# Patient Record
Sex: FEMALE | Race: WHITE | Employment: UNEMPLOYED | URBAN - METROPOLITAN AREA
[De-identification: names, ages, dates, MRNs, and addresses within clinical notes are randomized per-mention and may not be internally consistent; named-entity substitution may affect disease eponyms.]

---

## 2017-03-21 ENCOUNTER — HOSPITAL ENCOUNTER (OUTPATIENT)
Age: 22
Discharge: HOME | End: 2017-03-21
Payer: COMMERCIAL

## 2017-03-21 LAB
ABSOLUTE EOS #: 0.28 K/UL (ref 0–0.4)
ABSOLUTE LYMPH #: 3.34 K/UL (ref 1–4.8)
ABSOLUTE MONO #: 0.7 K/UL (ref 0.1–1.3)
ALBUMIN SERPL-MCNC: 4.1 G/DL (ref 3.5–5.2)
ALBUMIN/GLOBULIN RATIO: ABNORMAL (ref 1–2.5)
ALP BLD-CCNC: 86 U/L (ref 35–104)
ALT SERPL-CCNC: 22 U/L (ref 5–33)
ANION GAP SERPL CALCULATED.3IONS-SCNC: 14 MMOL/L (ref 9–17)
AST SERPL-CCNC: 24 U/L
BASOPHILS # BLD: 1 % (ref 0–2)
BASOPHILS ABSOLUTE: 0.14 K/UL (ref 0–0.2)
BILIRUB SERPL-MCNC: <0.15 MG/DL (ref 0.3–1.2)
BUN BLDV-MCNC: 13 MG/DL (ref 6–20)
BUN/CREAT BLD: ABNORMAL (ref 9–20)
CALCIUM SERPL-MCNC: 9.3 MG/DL (ref 8.6–10.4)
CHLORIDE BLD-SCNC: 102 MMOL/L (ref 98–107)
CO2: 25 MMOL/L (ref 20–31)
CREAT SERPL-MCNC: 0.56 MG/DL (ref 0.5–0.9)
DIFFERENTIAL TYPE: ABNORMAL
EOSINOPHILS RELATIVE PERCENT: 2 % (ref 0–4)
ESTIMATED AVERAGE GLUCOSE: 103 MG/DL
GFR AFRICAN AMERICAN: >60 ML/MIN
GFR NON-AFRICAN AMERICAN: >60 ML/MIN
GFR SERPL CREATININE-BSD FRML MDRD: ABNORMAL ML/MIN/{1.73_M2}
GFR SERPL CREATININE-BSD FRML MDRD: ABNORMAL ML/MIN/{1.73_M2}
GLUCOSE BLD-MCNC: 141 MG/DL (ref 70–99)
HBA1C MFR BLD: 5.2 % (ref 4–6)
HCT VFR BLD CALC: 40 % (ref 36–46)
HEMOGLOBIN: 13.2 G/DL (ref 12–16)
LYMPHOCYTES # BLD: 24 % (ref 24–44)
MCH RBC QN AUTO: 25.9 PG (ref 26–34)
MCHC RBC AUTO-ENTMCNC: 32.9 G/DL (ref 31–37)
MCV RBC AUTO: 78.6 FL (ref 80–100)
MONOCYTES # BLD: 5 % (ref 1–7)
MORPHOLOGY: ABNORMAL
PDW BLD-RTO: 13.9 % (ref 11.5–14.9)
PLATELET # BLD: 322 K/UL (ref 150–450)
PLATELET ESTIMATE: ABNORMAL
PMV BLD AUTO: 8.3 FL (ref 6–12)
POTASSIUM SERPL-SCNC: 4.1 MMOL/L (ref 3.7–5.3)
RBC # BLD: 5.09 M/UL (ref 4–5.2)
RBC # BLD: ABNORMAL 10*6/UL
SEG NEUTROPHILS: 68 % (ref 36–66)
SEGMENTED NEUTROPHILS ABSOLUTE COUNT: 9.44 K/UL (ref 1.3–9.1)
SODIUM BLD-SCNC: 141 MMOL/L (ref 135–144)
THYROXINE, FREE: 1.08 NG/DL (ref 0.93–1.7)
TOTAL PROTEIN: 7 G/DL (ref 6.4–8.3)
TSH SERPL DL<=0.05 MIU/L-ACNC: 2.38 MIU/L (ref 0.3–5)
WBC # BLD: 13.9 K/UL (ref 3.5–11)
WBC # BLD: ABNORMAL 10*3/UL

## 2017-03-21 PROCEDURE — 82652 VIT D 1 25-DIHYDROXY: CPT

## 2017-03-21 PROCEDURE — 84443 ASSAY THYROID STIM HORMONE: CPT

## 2017-03-21 PROCEDURE — 85025 COMPLETE CBC W/AUTO DIFF WBC: CPT

## 2017-03-21 PROCEDURE — 84439 ASSAY OF FREE THYROXINE: CPT

## 2017-03-21 PROCEDURE — 36415 COLL VENOUS BLD VENIPUNCTURE: CPT

## 2017-03-21 PROCEDURE — 83036 HEMOGLOBIN GLYCOSYLATED A1C: CPT

## 2017-03-21 PROCEDURE — 80053 COMPREHEN METABOLIC PANEL: CPT

## 2017-03-23 LAB — VITAMIN D 1,25-DIHYDROXY: 35.9 PG/ML (ref 19.9–79.3)

## 2017-03-29 ENCOUNTER — HOSPITAL ENCOUNTER (OUTPATIENT)
Age: 22
Discharge: HOME | End: 2017-03-29
Payer: COMMERCIAL

## 2017-03-29 LAB
ABSOLUTE EOS #: 0.38 K/UL (ref 0–0.4)
ABSOLUTE LYMPH #: 3.75 K/UL (ref 1–4.8)
ABSOLUTE MONO #: 0.75 K/UL (ref 0.1–1.3)
ALBUMIN SERPL-MCNC: 4.1 G/DL (ref 3.5–5.2)
ALBUMIN/GLOBULIN RATIO: ABNORMAL (ref 1–2.5)
ALP BLD-CCNC: 82 U/L (ref 35–104)
ALT SERPL-CCNC: 25 U/L (ref 5–33)
ANION GAP SERPL CALCULATED.3IONS-SCNC: 14 MMOL/L (ref 9–17)
AST SERPL-CCNC: 23 U/L
BASOPHILS # BLD: 1 % (ref 0–2)
BASOPHILS ABSOLUTE: 0.13 K/UL (ref 0–0.2)
BILIRUB SERPL-MCNC: 0.2 MG/DL (ref 0.3–1.2)
BUN BLDV-MCNC: 11 MG/DL (ref 6–20)
BUN/CREAT BLD: ABNORMAL (ref 9–20)
CALCIUM SERPL-MCNC: 9.5 MG/DL (ref 8.6–10.4)
CHLORIDE BLD-SCNC: 105 MMOL/L (ref 98–107)
CHOLESTEROL/HDL RATIO: 4.6
CHOLESTEROL: 190 MG/DL
CO2: 23 MMOL/L (ref 20–31)
CREAT SERPL-MCNC: 0.53 MG/DL (ref 0.5–0.9)
DIFFERENTIAL TYPE: ABNORMAL
EOSINOPHILS RELATIVE PERCENT: 3 % (ref 0–4)
GFR AFRICAN AMERICAN: >60 ML/MIN
GFR NON-AFRICAN AMERICAN: >60 ML/MIN
GFR SERPL CREATININE-BSD FRML MDRD: ABNORMAL ML/MIN/{1.73_M2}
GFR SERPL CREATININE-BSD FRML MDRD: ABNORMAL ML/MIN/{1.73_M2}
GLUCOSE BLD-MCNC: 95 MG/DL (ref 70–99)
HCT VFR BLD CALC: 40.6 % (ref 36–46)
HDLC SERPL-MCNC: 41 MG/DL
HEMOGLOBIN: 13.2 G/DL (ref 12–16)
LDL CHOLESTEROL: 127 MG/DL (ref 0–130)
LYMPHOCYTES # BLD: 30 % (ref 24–44)
MCH RBC QN AUTO: 25.6 PG (ref 26–34)
MCHC RBC AUTO-ENTMCNC: 32.5 G/DL (ref 31–37)
MCV RBC AUTO: 79 FL (ref 80–100)
MONOCYTES # BLD: 6 % (ref 1–7)
MORPHOLOGY: ABNORMAL
PDW BLD-RTO: 14.4 % (ref 11.5–14.9)
PLATELET # BLD: 287 K/UL (ref 150–450)
PLATELET ESTIMATE: ABNORMAL
PMV BLD AUTO: 9 FL (ref 6–12)
POTASSIUM SERPL-SCNC: 4.7 MMOL/L (ref 3.7–5.3)
RBC # BLD: 5.14 M/UL (ref 4–5.2)
RBC # BLD: ABNORMAL 10*6/UL
SEG NEUTROPHILS: 60 % (ref 36–66)
SEGMENTED NEUTROPHILS ABSOLUTE COUNT: 7.49 K/UL (ref 1.3–9.1)
SODIUM BLD-SCNC: 142 MMOL/L (ref 135–144)
THYROXINE, FREE: 1.05 NG/DL (ref 0.93–1.7)
TOTAL PROTEIN: 7.4 G/DL (ref 6.4–8.3)
TRIGL SERPL-MCNC: 108 MG/DL
TSH SERPL DL<=0.05 MIU/L-ACNC: 2.26 MIU/L (ref 0.3–5)
VLDLC SERPL CALC-MCNC: NORMAL MG/DL (ref 1–30)
WBC # BLD: 12.5 K/UL (ref 3.5–11)
WBC # BLD: ABNORMAL 10*3/UL

## 2017-03-29 PROCEDURE — 80053 COMPREHEN METABOLIC PANEL: CPT

## 2017-03-29 PROCEDURE — 84443 ASSAY THYROID STIM HORMONE: CPT

## 2017-03-29 PROCEDURE — 85025 COMPLETE CBC W/AUTO DIFF WBC: CPT

## 2017-03-29 PROCEDURE — 84439 ASSAY OF FREE THYROXINE: CPT

## 2017-03-29 PROCEDURE — 83036 HEMOGLOBIN GLYCOSYLATED A1C: CPT

## 2017-03-29 PROCEDURE — 82652 VIT D 1 25-DIHYDROXY: CPT

## 2017-03-29 PROCEDURE — 36415 COLL VENOUS BLD VENIPUNCTURE: CPT

## 2017-03-29 PROCEDURE — 80061 LIPID PANEL: CPT

## 2017-03-30 LAB
ESTIMATED AVERAGE GLUCOSE: 105 MG/DL
HBA1C MFR BLD: 5.3 % (ref 4–6)

## 2017-03-31 LAB — VITAMIN D 1,25-DIHYDROXY: 63.2 PG/ML (ref 19.9–79.3)

## 2017-05-31 ENCOUNTER — OFFICE VISIT (OUTPATIENT)
Dept: FAMILY MEDICINE CLINIC | Age: 22
End: 2017-05-31
Payer: COMMERCIAL

## 2017-05-31 VITALS
HEIGHT: 59 IN | BODY MASS INDEX: 34.68 KG/M2 | SYSTOLIC BLOOD PRESSURE: 118 MMHG | DIASTOLIC BLOOD PRESSURE: 62 MMHG | OXYGEN SATURATION: 97 % | HEART RATE: 80 BPM | TEMPERATURE: 98 F | WEIGHT: 172 LBS | RESPIRATION RATE: 14 BRPM

## 2017-05-31 DIAGNOSIS — K21.9 GASTROESOPHAGEAL REFLUX DISEASE WITHOUT ESOPHAGITIS: ICD-10-CM

## 2017-05-31 DIAGNOSIS — F41.9 ANXIETY: ICD-10-CM

## 2017-05-31 DIAGNOSIS — J45.20 MILD INTERMITTENT ASTHMA WITHOUT COMPLICATION: Primary | ICD-10-CM

## 2017-05-31 DIAGNOSIS — F32.4 MAJOR DEPRESSIVE DISORDER WITH SINGLE EPISODE, IN PARTIAL REMISSION (HCC): ICD-10-CM

## 2017-05-31 PROCEDURE — 99203 OFFICE O/P NEW LOW 30 MIN: CPT | Performed by: FAMILY MEDICINE

## 2017-05-31 RX ORDER — FLUTICASONE PROPIONATE 110 UG/1
1 AEROSOL, METERED RESPIRATORY (INHALATION) 2 TIMES DAILY
Qty: 1 INHALER | Refills: 3 | Status: SHIPPED | OUTPATIENT
Start: 2017-05-31 | End: 2017-12-02 | Stop reason: SDUPTHER

## 2017-05-31 RX ORDER — ALBUTEROL SULFATE 90 UG/1
2 AEROSOL, METERED RESPIRATORY (INHALATION) EVERY 6 HOURS PRN
Qty: 1 INHALER | Refills: 3 | Status: SHIPPED | OUTPATIENT
Start: 2017-05-31 | End: 2019-01-28 | Stop reason: SDUPTHER

## 2017-05-31 RX ORDER — OMEPRAZOLE 20 MG/1
CAPSULE, DELAYED RELEASE ORAL
Qty: 30 CAPSULE | Refills: 3 | Status: SHIPPED | OUTPATIENT
Start: 2017-05-31 | End: 2017-09-12 | Stop reason: SDUPTHER

## 2017-09-12 ENCOUNTER — OFFICE VISIT (OUTPATIENT)
Dept: FAMILY MEDICINE CLINIC | Age: 22
End: 2017-09-12
Payer: COMMERCIAL

## 2017-09-12 ENCOUNTER — HOSPITAL ENCOUNTER (OUTPATIENT)
Age: 22
Setting detail: SPECIMEN
Discharge: HOME | End: 2017-09-12
Payer: COMMERCIAL

## 2017-09-12 VITALS
TEMPERATURE: 98.4 F | BODY MASS INDEX: 35.71 KG/M2 | WEIGHT: 176.8 LBS | DIASTOLIC BLOOD PRESSURE: 61 MMHG | SYSTOLIC BLOOD PRESSURE: 100 MMHG | HEART RATE: 87 BPM

## 2017-09-12 DIAGNOSIS — Z12.4 PAP SMEAR FOR CERVICAL CANCER SCREENING: ICD-10-CM

## 2017-09-12 DIAGNOSIS — K21.9 GASTROESOPHAGEAL REFLUX DISEASE WITHOUT ESOPHAGITIS: ICD-10-CM

## 2017-09-12 DIAGNOSIS — Z12.4 PAP SMEAR FOR CERVICAL CANCER SCREENING: Primary | ICD-10-CM

## 2017-09-12 PROCEDURE — 99395 PREV VISIT EST AGE 18-39: CPT | Performed by: FAMILY MEDICINE

## 2017-09-12 RX ORDER — OMEPRAZOLE 40 MG/1
CAPSULE, DELAYED RELEASE ORAL
Qty: 30 CAPSULE | Refills: 3 | Status: SHIPPED | OUTPATIENT
Start: 2017-09-12 | End: 2017-12-27 | Stop reason: SDUPTHER

## 2017-09-12 ASSESSMENT — PATIENT HEALTH QUESTIONNAIRE - PHQ9
2. FEELING DOWN, DEPRESSED OR HOPELESS: 0
SUM OF ALL RESPONSES TO PHQ9 QUESTIONS 1 & 2: 0
SUM OF ALL RESPONSES TO PHQ QUESTIONS 1-9: 0
1. LITTLE INTEREST OR PLEASURE IN DOING THINGS: 0

## 2017-09-13 LAB
C TRACH DNA GENITAL QL NAA+PROBE: NEGATIVE
N. GONORRHOEAE DNA: NEGATIVE

## 2017-09-14 LAB
HPV SAMPLE: NORMAL
HPV SOURCE: NORMAL
HPV, GENOTYPE 16: NOT DETECTED
HPV, GENOTYPE 18: NOT DETECTED
HPV, HIGH RISK OTHER: NOT DETECTED
HPV, INTERPRETATION: NORMAL

## 2017-09-20 LAB — CYTOLOGY REPORT: NORMAL

## 2017-11-13 ENCOUNTER — OFFICE VISIT (OUTPATIENT)
Dept: FAMILY MEDICINE CLINIC | Age: 22
End: 2017-11-13
Payer: COMMERCIAL

## 2017-11-13 VITALS
BODY MASS INDEX: 35.56 KG/M2 | SYSTOLIC BLOOD PRESSURE: 110 MMHG | WEIGHT: 176.4 LBS | TEMPERATURE: 98.4 F | OXYGEN SATURATION: 98 % | DIASTOLIC BLOOD PRESSURE: 60 MMHG | HEART RATE: 99 BPM | HEIGHT: 59 IN | RESPIRATION RATE: 20 BRPM

## 2017-11-13 DIAGNOSIS — A60.04 HERPES SIMPLEX VULVOVAGINITIS: ICD-10-CM

## 2017-11-13 DIAGNOSIS — J45.20 MILD INTERMITTENT ASTHMA WITHOUT COMPLICATION: ICD-10-CM

## 2017-11-13 DIAGNOSIS — K21.9 GASTROESOPHAGEAL REFLUX DISEASE WITHOUT ESOPHAGITIS: Primary | ICD-10-CM

## 2017-11-13 DIAGNOSIS — Z80.3 FAMILY HISTORY OF BREAST CANCER: ICD-10-CM

## 2017-11-13 DIAGNOSIS — Z23 NEED FOR VACCINATION: ICD-10-CM

## 2017-11-13 PROCEDURE — G8417 CALC BMI ABV UP PARAM F/U: HCPCS | Performed by: FAMILY MEDICINE

## 2017-11-13 PROCEDURE — G8427 DOCREV CUR MEDS BY ELIG CLIN: HCPCS | Performed by: FAMILY MEDICINE

## 2017-11-13 PROCEDURE — G8484 FLU IMMUNIZE NO ADMIN: HCPCS | Performed by: FAMILY MEDICINE

## 2017-11-13 PROCEDURE — 90471 IMMUNIZATION ADMIN: CPT | Performed by: FAMILY MEDICINE

## 2017-11-13 PROCEDURE — 90688 IIV4 VACCINE SPLT 0.5 ML IM: CPT | Performed by: FAMILY MEDICINE

## 2017-11-13 PROCEDURE — 1036F TOBACCO NON-USER: CPT | Performed by: FAMILY MEDICINE

## 2017-11-13 PROCEDURE — 99214 OFFICE O/P EST MOD 30 MIN: CPT | Performed by: FAMILY MEDICINE

## 2017-11-13 RX ORDER — VALACYCLOVIR HYDROCHLORIDE 500 MG/1
500 TABLET, FILM COATED ORAL 2 TIMES DAILY
Qty: 14 TABLET | Refills: 0 | Status: SHIPPED | OUTPATIENT
Start: 2017-11-13 | End: 2017-11-20

## 2017-11-13 ASSESSMENT — ENCOUNTER SYMPTOMS
VOMITING: 0
RHINORRHEA: 0
FACIAL SWELLING: 0
SINUS PRESSURE: 0
DIARRHEA: 0
COUGH: 0
BACK PAIN: 0
PHOTOPHOBIA: 0
BLOOD IN STOOL: 0
CONSTIPATION: 0
WHEEZING: 0
SHORTNESS OF BREATH: 0
ABDOMINAL PAIN: 0

## 2017-11-13 NOTE — PROGRESS NOTES
is not valid for  the evaluation of suspected sexual abuse or for other forensic  purposes.                       Most recent labs reviewed:     Lab Results   Component Value Date    WBC 12.5 (H) 03/29/2017    HGB 13.2 03/29/2017    HCT 40.6 03/29/2017    MCV 79.0 (L) 03/29/2017     03/29/2017       Lab Results   Component Value Date     03/29/2017    K 4.7 03/29/2017     03/29/2017    CO2 23 03/29/2017    BUN 11 03/29/2017    CREATININE 0.53 03/29/2017    GLUCOSE 95 03/29/2017    GLUCOSE 85 12/10/2011    CALCIUM 9.5 03/29/2017        Lab Results   Component Value Date    ALT 25 03/29/2017    AST 23 03/29/2017    ALKPHOS 82 03/29/2017    BILITOT 0.20 (L) 03/29/2017       Lab Results   Component Value Date    TSH 2.26 03/29/2017       Lab Results   Component Value Date    CHOL 190 03/29/2017    CHOL 186 02/04/2015    CHOL 170 05/10/2013     Lab Results   Component Value Date    TRIG 108 03/29/2017    TRIG 140 02/04/2015    TRIG 179 (H) 05/10/2013     Lab Results   Component Value Date    HDL 41 03/29/2017    HDL 40 (L) 02/04/2015    HDL 42 05/10/2013     Lab Results   Component Value Date    LDLCHOLESTEROL 127 03/29/2017    LDLCHOLESTEROL 118 02/04/2015    LDLCHOLESTEROL 92 05/10/2013     Lab Results   Component Value Date    VLDL NOT REPORTED 03/29/2017    VLDL NOT REPORTED 02/04/2015    VLDL NOT REPORTED 05/10/2013     Lab Results   Component Value Date    CHOLHDLRATIO 4.6 03/29/2017    CHOLHDLRATIO 4.7 02/04/2015    CHOLHDLRATIO 4.0 05/10/2013       Lab Results   Component Value Date    LABA1C 5.3 03/29/2017       No results found for: [id number]    No results found for: FOLATE    No results found for: IRON, TIBC, FERRITIN    No results found for: VITD25          Current Outpatient Prescriptions   Medication Sig Dispense Refill    valACYclovir (VALTREX) 500 MG tablet Take 1 tablet by mouth 2 times daily for 7 days 14 tablet 0    omeprazole (PRILOSEC) 40 MG delayed release capsule TAKE 1 CAPSULE BY MOUTH DAILY 30 capsule 3    fluticasone (FLOVENT HFA) 110 MCG/ACT inhaler Inhale 1 puff into the lungs 2 times daily 1 Inhaler 3    albuterol sulfate HFA (VENTOLIN HFA) 108 (90 BASE) MCG/ACT inhaler Inhale 2 puffs into the lungs every 6 hours as needed for Wheezing 1 Inhaler 3    sertraline (ZOLOFT) 25 MG tablet TK 1 T PO  Q NIGHT  1     No current facility-administered medications for this visit. Social History     Social History    Marital status: Single     Spouse name: N/A    Number of children: N/A    Years of education: N/A     Occupational History    Not on file. Social History Main Topics    Smoking status: Former Smoker     Quit date: 9/12/2012    Smokeless tobacco: Never Used    Alcohol use No    Drug use: No    Sexual activity: Yes     Partners: Male     Other Topics Concern    Not on file     Social History Narrative    No narrative on file     Counseling given: Not Answered        Family History   Problem Relation Age of Onset    Depression Mother     Cancer Paternal Aunt     Cancer Paternal Grandmother      breast    Diabetes Paternal Grandmother     Cancer Paternal Grandfather      lung             -rest of complaints with corresponding details per ROS    The patient's past medical, surgical, social, and family history as well as her current medications and allergies were reviewed as documented in today's encounter. Review of Systems   Constitutional: Negative for appetite change, chills, fatigue and unexpected weight change. HENT: Negative for congestion, facial swelling, postnasal drip, rhinorrhea and sinus pressure. Eyes: Negative for photophobia. Respiratory: Negative for cough, shortness of breath and wheezing. Cardiovascular: Negative for chest pain and palpitations. Gastrointestinal: Negative for abdominal pain, blood in stool, constipation, diarrhea and vomiting. Endocrine: Negative for polyphagia and polyuria.    Genitourinary: medications and allergies were reviewed as documented in today's encounter. Medications, labs, diagnostic studies, consultations and follow-up as documented in this encounter. Return in about 6 months (around 5/13/2018), or if symptoms worsen or fail to improve, for for penumovacc, hepres genitals, asthma . Patient was seen with total face to face time of 25 minutes. More than 50% of this visit was counseling and education. Future Appointments  Date Time Provider Samantha Lehman   5/14/2018 12:00 PM [facility]     This note was completed by using the assistance of a speech-recognition program. However, inadvertent computerized transcription errors may be present. Although every effort was made to ensure accuracy, no guarantees can be provided that every mistake has been identified and corrected by editing.   Electronically signed by Cheryl Montemayor MD on 11/13/2017  2:35 PM

## 2017-12-02 DIAGNOSIS — J45.20 MILD INTERMITTENT ASTHMA WITHOUT COMPLICATION: ICD-10-CM

## 2017-12-04 RX ORDER — DEXAMETHASONE 4 MG/1
TABLET ORAL
Qty: 12 INHALER | Refills: 3 | Status: SHIPPED | OUTPATIENT
Start: 2017-12-04 | End: 2018-03-25 | Stop reason: SDUPTHER

## 2017-12-04 NOTE — TELEPHONE ENCOUNTER
Please Approve or Refuse.        Next Visit Date:  5/14/2018    Hemoglobin A1C (%)   Date Value   03/29/2017 5.3   03/21/2017 5.2             ( goal A1C is < 7)   No results found for: LABMICR  LDL Cholesterol (mg/dL)   Date Value   03/29/2017 127       (goal LDL is <100)   AST (U/L)   Date Value   03/29/2017 23     ALT (U/L)   Date Value   03/29/2017 25     BUN (mg/dL)   Date Value   03/29/2017 11     BP Readings from Last 3 Encounters:   11/13/17 110/60   09/12/17 100/61   05/31/17 118/62          (goal 120/80)        Patient Active Problem List:     ADHD (attention deficit hyperactivity disorder)     Acne vulgaris     Underweight     Head ache     Menometrorrhagia     GERD (gastroesophageal reflux disease)     Vision abnormalities     Family history of breast cancer     Herpes simplex vulvovaginitis II     HPV (human papilloma virus) infection     Mild intermittent asthma without complication     Anxiety     Major depressive disorder with single episode, in partial remission ([facility] [address]

## 2017-12-27 DIAGNOSIS — K21.9 GASTROESOPHAGEAL REFLUX DISEASE WITHOUT ESOPHAGITIS: ICD-10-CM

## 2017-12-28 RX ORDER — OMEPRAZOLE 40 MG/1
CAPSULE, DELAYED RELEASE ORAL
Qty: 30 CAPSULE | Refills: 3 | Status: SHIPPED | OUTPATIENT
Start: 2017-12-28 | End: 2018-04-20 | Stop reason: SDUPTHER

## 2017-12-28 NOTE — TELEPHONE ENCOUNTER
Please Approve or Refuse.        Next Visit Date:  5/14/2018    Hemoglobin A1C (%)   Date Value   03/29/2017 5.3   03/21/2017 5.2             ( goal A1C is < 7)   No results found for: LABMICR  LDL Cholesterol (mg/dL)   Date Value   03/29/2017 127       (goal LDL is <100)   AST (U/L)   Date Value   03/29/2017 23     ALT (U/L)   Date Value   03/29/2017 25     BUN (mg/dL)   Date Value   03/29/2017 11     BP Readings from Last 3 Encounters:   11/13/17 110/60   09/12/17 100/61   05/31/17 118/62          (goal 120/80)        Patient Active Problem List:     ADHD (attention deficit hyperactivity disorder)     Acne vulgaris     Underweight     Head ache     Menometrorrhagia     GERD (gastroesophageal reflux disease)     Vision abnormalities     Family history of breast cancer     Herpes simplex vulvovaginitis II     HPV (human papilloma virus) infection     Mild intermittent asthma without complication     Anxiety     Major depressive disorder with single episode, in partial remission ([facility] [address]

## 2018-03-25 DIAGNOSIS — J45.20 MILD INTERMITTENT ASTHMA WITHOUT COMPLICATION: ICD-10-CM

## 2018-03-26 RX ORDER — DEXAMETHASONE 4 MG/1
TABLET ORAL
Qty: 12 G | Refills: 3 | Status: SHIPPED | OUTPATIENT
Start: 2018-03-26

## 2018-04-12 ENCOUNTER — OFFICE VISIT (OUTPATIENT)
Dept: FAMILY MEDICINE CLINIC | Age: 23
End: 2018-04-12
Payer: COMMERCIAL

## 2018-04-12 VITALS
BODY MASS INDEX: 35.36 KG/M2 | TEMPERATURE: 99.2 F | OXYGEN SATURATION: 98 % | WEIGHT: 175.4 LBS | SYSTOLIC BLOOD PRESSURE: 100 MMHG | HEIGHT: 59 IN | HEART RATE: 94 BPM | DIASTOLIC BLOOD PRESSURE: 80 MMHG | RESPIRATION RATE: 16 BRPM

## 2018-04-12 DIAGNOSIS — F32.4 MAJOR DEPRESSIVE DISORDER WITH SINGLE EPISODE, IN PARTIAL REMISSION (HCC): Primary | ICD-10-CM

## 2018-04-12 DIAGNOSIS — F41.9 ANXIETY: ICD-10-CM

## 2018-04-12 PROCEDURE — G8427 DOCREV CUR MEDS BY ELIG CLIN: HCPCS | Performed by: FAMILY MEDICINE

## 2018-04-12 PROCEDURE — 1036F TOBACCO NON-USER: CPT | Performed by: FAMILY MEDICINE

## 2018-04-12 PROCEDURE — G8417 CALC BMI ABV UP PARAM F/U: HCPCS | Performed by: FAMILY MEDICINE

## 2018-04-12 PROCEDURE — 99213 OFFICE O/P EST LOW 20 MIN: CPT | Performed by: FAMILY MEDICINE

## 2018-04-12 ASSESSMENT — ENCOUNTER SYMPTOMS
BACK PAIN: 0
DIARRHEA: 0
SINUS PRESSURE: 0
PHOTOPHOBIA: 0
BLOOD IN STOOL: 0
COUGH: 0
SHORTNESS OF BREATH: 0
CONSTIPATION: 0

## 2018-04-20 DIAGNOSIS — K21.9 GASTROESOPHAGEAL REFLUX DISEASE WITHOUT ESOPHAGITIS: ICD-10-CM

## 2018-04-20 RX ORDER — OMEPRAZOLE 40 MG/1
CAPSULE, DELAYED RELEASE ORAL
Qty: 30 CAPSULE | Refills: 3 | Status: SHIPPED | OUTPATIENT
Start: 2018-04-20 | End: 2018-08-21 | Stop reason: SDUPTHER

## 2018-06-05 ENCOUNTER — OFFICE VISIT (OUTPATIENT)
Dept: FAMILY MEDICINE CLINIC | Age: 23
End: 2018-06-05
Payer: COMMERCIAL

## 2018-06-05 ENCOUNTER — TELEPHONE (OUTPATIENT)
Dept: FAMILY MEDICINE CLINIC | Age: 23
End: 2018-06-05

## 2018-06-05 VITALS
DIASTOLIC BLOOD PRESSURE: 72 MMHG | HEART RATE: 87 BPM | SYSTOLIC BLOOD PRESSURE: 99 MMHG | HEIGHT: 59 IN | WEIGHT: 172.2 LBS | OXYGEN SATURATION: 98 % | TEMPERATURE: 98.6 F | BODY MASS INDEX: 34.72 KG/M2

## 2018-06-05 DIAGNOSIS — J45.20 MILD INTERMITTENT ASTHMA WITHOUT COMPLICATION: ICD-10-CM

## 2018-06-05 DIAGNOSIS — F32.4 MAJOR DEPRESSIVE DISORDER WITH SINGLE EPISODE, IN PARTIAL REMISSION (HCC): ICD-10-CM

## 2018-06-05 DIAGNOSIS — M25.561 CHRONIC PAIN OF RIGHT KNEE: Primary | ICD-10-CM

## 2018-06-05 DIAGNOSIS — Z80.3 FAMILY HISTORY OF BREAST CANCER IN FIRST DEGREE RELATIVE: ICD-10-CM

## 2018-06-05 DIAGNOSIS — G89.29 CHRONIC PAIN OF RIGHT KNEE: Primary | ICD-10-CM

## 2018-06-05 PROCEDURE — 99214 OFFICE O/P EST MOD 30 MIN: CPT | Performed by: FAMILY MEDICINE

## 2018-06-05 PROCEDURE — 1036F TOBACCO NON-USER: CPT | Performed by: FAMILY MEDICINE

## 2018-06-05 PROCEDURE — G8427 DOCREV CUR MEDS BY ELIG CLIN: HCPCS | Performed by: FAMILY MEDICINE

## 2018-06-05 PROCEDURE — G8417 CALC BMI ABV UP PARAM F/U: HCPCS | Performed by: FAMILY MEDICINE

## 2018-06-05 ASSESSMENT — ENCOUNTER SYMPTOMS
ABDOMINAL PAIN: 0
WHEEZING: 0
RHINORRHEA: 0
DIARRHEA: 0
SINUS PAIN: 0
CONSTIPATION: 0
SHORTNESS OF BREATH: 0
NAUSEA: 0
CHEST TIGHTNESS: 0

## 2018-06-07 ENCOUNTER — TELEPHONE (OUTPATIENT)
Dept: FAMILY MEDICINE CLINIC | Age: 23
End: 2018-06-07

## 2018-06-07 DIAGNOSIS — G89.29 CHRONIC PAIN OF RIGHT KNEE: Primary | ICD-10-CM

## 2018-06-07 DIAGNOSIS — M25.561 CHRONIC PAIN OF RIGHT KNEE: Primary | ICD-10-CM

## 2018-08-21 DIAGNOSIS — K21.9 GASTROESOPHAGEAL REFLUX DISEASE WITHOUT ESOPHAGITIS: ICD-10-CM

## 2018-08-21 RX ORDER — OMEPRAZOLE 40 MG/1
CAPSULE, DELAYED RELEASE ORAL
Qty: 30 CAPSULE | Refills: 3 | Status: SHIPPED | OUTPATIENT
Start: 2018-08-21 | End: 2019-05-14 | Stop reason: SDUPTHER

## 2018-12-05 ENCOUNTER — OFFICE VISIT (OUTPATIENT)
Dept: FAMILY MEDICINE CLINIC | Age: 23
End: 2018-12-05
Payer: MEDICAID

## 2018-12-05 VITALS
SYSTOLIC BLOOD PRESSURE: 105 MMHG | BODY MASS INDEX: 34.96 KG/M2 | OXYGEN SATURATION: 99 % | WEIGHT: 173.4 LBS | HEART RATE: 93 BPM | DIASTOLIC BLOOD PRESSURE: 71 MMHG | HEIGHT: 59 IN

## 2018-12-05 DIAGNOSIS — K21.9 GASTROESOPHAGEAL REFLUX DISEASE WITHOUT ESOPHAGITIS: Primary | ICD-10-CM

## 2018-12-05 DIAGNOSIS — R73.9 HYPERGLYCEMIA: ICD-10-CM

## 2018-12-05 DIAGNOSIS — K59.04 CHRONIC IDIOPATHIC CONSTIPATION: ICD-10-CM

## 2018-12-05 DIAGNOSIS — R10.13 EPIGASTRIC PAIN: ICD-10-CM

## 2018-12-05 DIAGNOSIS — Z80.3 FAMILY HISTORY OF BREAST CANCER IN FIRST DEGREE RELATIVE: ICD-10-CM

## 2018-12-05 LAB — HBA1C MFR BLD: 5.3 %

## 2018-12-05 PROCEDURE — G8417 CALC BMI ABV UP PARAM F/U: HCPCS | Performed by: FAMILY MEDICINE

## 2018-12-05 PROCEDURE — G8427 DOCREV CUR MEDS BY ELIG CLIN: HCPCS | Performed by: FAMILY MEDICINE

## 2018-12-05 PROCEDURE — 83036 HEMOGLOBIN GLYCOSYLATED A1C: CPT | Performed by: FAMILY MEDICINE

## 2018-12-05 PROCEDURE — 1036F TOBACCO NON-USER: CPT | Performed by: FAMILY MEDICINE

## 2018-12-05 PROCEDURE — 99214 OFFICE O/P EST MOD 30 MIN: CPT | Performed by: FAMILY MEDICINE

## 2018-12-05 PROCEDURE — G8484 FLU IMMUNIZE NO ADMIN: HCPCS | Performed by: FAMILY MEDICINE

## 2018-12-05 RX ORDER — DOCUSATE SODIUM 100 MG/1
100 CAPSULE, LIQUID FILLED ORAL 2 TIMES DAILY PRN
Qty: 60 CAPSULE | Refills: 3 | Status: ON HOLD | OUTPATIENT
Start: 2018-12-05 | End: 2020-02-19 | Stop reason: HOSPADM

## 2018-12-05 RX ORDER — MAGNESIUM HYDROXIDE/ALUMINUM HYDROXICE/SIMETHICONE 120; 1200; 1200 MG/30ML; MG/30ML; MG/30ML
5 SUSPENSION ORAL EVERY 6 HOURS PRN
Qty: 1 BOTTLE | Refills: 0 | Status: SHIPPED | OUTPATIENT
Start: 2018-12-05 | End: 2019-01-28 | Stop reason: SDUPTHER

## 2018-12-05 ASSESSMENT — ENCOUNTER SYMPTOMS
SHORTNESS OF BREATH: 0
VOMITING: 1
CONSTIPATION: 1
SINUS PRESSURE: 0
RECTAL PAIN: 0
ABDOMINAL DISTENTION: 1
PHOTOPHOBIA: 0
WHEEZING: 0
DIARRHEA: 0
BLOOD IN STOOL: 0
RHINORRHEA: 0
NAUSEA: 1
ABDOMINAL PAIN: 1

## 2018-12-05 ASSESSMENT — PATIENT HEALTH QUESTIONNAIRE - PHQ9
2. FEELING DOWN, DEPRESSED OR HOPELESS: 0
1. LITTLE INTEREST OR PLEASURE IN DOING THINGS: 1
SUM OF ALL RESPONSES TO PHQ9 QUESTIONS 1 & 2: 1
SUM OF ALL RESPONSES TO PHQ QUESTIONS 1-9: 1
SUM OF ALL RESPONSES TO PHQ QUESTIONS 1-9: 1

## 2018-12-05 NOTE — PROGRESS NOTES
Chief Complaint   Patient presents with    [address]  here today for follow up on chronic medical problems, go over labs and/or diagnostic studies, and medication refills. 6 Month Follow-Up      HPI:Patient is here for to discuss mammogram she has not done that. Patient reports symptoms of GERD has chronic history, on Prilosec reports that started helping. She feels safe there is a reflux symptoms in the morning and sometimes in the evening that she starts vomiting with dark vomit. Sometimes she noticed streaks of blood. Patient had EGD done in the past in 2016 which was normal.    Patient denies any weight loss, denies any decreased appetite does complain of mild abdominal pain and epigastric region dull achy about 4-5 on scale associated with constipation denies any diarrhea. The pain gets worse with food. Patient denies any blood in stools. /71 (Site: Left Upper Arm, Position: Sitting)   Pulse 93   Ht 4' 11\" (1.499 m)   Wt 173 lb 6.4 oz (78.7 kg)   SpO2 99%   BMI 35.02 kg/m²   Body mass index is 35.02 kg/m². Wt Readings from Last 3 Encounters:   12/05/18 173 lb 6.4 oz (78.7 kg)   06/05/18 172 lb 3.2 oz (78.1 kg)   04/12/18 175 lb 6.4 oz (79.6 kg)        []Negative depression screening. PHQ Scores 12/5/2018 9/12/2017 7/14/2016   PHQ2 Score 1 0 0   PHQ9 Score 1 0 0      []1-4 = Minimal depression   []5-9 = Milddepression   []10-14 = Moderate depression   []15-19 = Moderately severe depression   []20-27 = Severe depression    Discussed testing with the patient and all questions fully answered. Hospital Outpatient Visit on 09/12/2017   Component Date Value Ref Range Status    C. trachomatis DNA 09/12/2017 NEGATIVE  NEG Final    CHLAMYDIA TRACHOMATIS DNA not detected by nucleic acid amplification.  N. gonorrhoeae DNA 09/12/2017 NEGATIVE  NEG Final    Comment: NEISSERIA GONORRHOEAE DNA not detected by nucleic acid amplification.   [facility] [id number] [phone]      HPV SOURCE 09/12/2017 CERVICAL MATERIAL   Final    HPV Sample 09/12/2017 . THIN PREP   Final    HPV, Genotype 16 09/12/2017 Not Detected  NOTDET Final    HPV, Genotype 18 09/12/2017 Not Detected  NOTDET Final    HPV, High Risk Other 09/12/2017 Not Detected  NOTDET Final    HPV, Interpretation 09/12/2017        Final    Comment: This test amplifies and detects DNA of 14 high-risk HPV types associated with   cervical cancer and its precursor lesions (HPV types 16,18, 31, 33, 35, 39, 45,   51, 52, 56, 58, 59, 66, and 68). Sensitivity may be affected by specimen collection methods, stage of infection,   and the presence of interfering substances. Results should be interpreted in conjunction with other available laboratory and   clinical data. A negative high-risk HPV result does not exclude the possibility of future   cytologic HSIL or underlying CIN2-3 or cancer. This test is intended for medical purposes only and is not valid for the   evaluation of suspected sexual abuse or for other forensic purposes. [facility] [address] [address] [phone]      Cytology Report 09/12/2017    Final                    Value:(NOTE)  [id number]  [facility]  [facility]  ANATOMIC PATHOLOGY  [address] [place] [address], [place] [address]. [place], 2018 [address]  [phone]  Fax: [phone]  GYNECOLOGIC CYTOLOGY REPORT    Patient Name: Allie Potter  MR#: [id number]  Specimen #[id number]  Source:  1: Cervical material, (ThinPrep vial, Imaging-assisted review)    Clinical History  No LMP date given: having regular periods  Z12.4 Encounter for screening for malignant neoplasm of cervix  Co-Test:  ThinPrep Pap with high risk HPV testing    INTERPRETATION    Cervical material, (ThinPrep vial, Imaging-assisted review):  Specimen Adequacy:      Unsatisfactory for evaluation.   Specimen processed and examined but WBC 12.5 (H) 03/29/2017    HGB 13.2 03/29/2017    HCT 40.6 03/29/2017    MCV 79.0 (L) 03/29/2017     03/29/2017       @BRIEFLAB(NA,K,CL,CO2,BUN,CREATININE,GLUCOSE,CALCIUM)@     Lab Results   Component Value Date    ALT 25 03/29/2017    AST 23 03/29/2017    ALKPHOS 82 03/29/2017    BILITOT 0.20 (L) 03/29/2017       Lab Results   Component Value Date    TSH 2.26 03/29/2017       Lab Results   Component Value Date    CHOL 190 03/29/2017    CHOL 186 02/04/2015    CHOL 170 05/10/2013     Lab Results   Component Value Date    TRIG 108 03/29/2017    TRIG 140 02/04/2015    TRIG 179 (H) 05/10/2013     Lab Results   Component Value Date    HDL 41 03/29/2017    HDL 40 (L) 02/04/2015    HDL 42 05/10/2013     Lab Results   Component Value Date    LDLCHOLESTEROL 127 03/29/2017    LDLCHOLESTEROL 118 02/04/2015    LDLCHOLESTEROL 92 05/10/2013     Lab Results   Component Value Date    VLDL NOT REPORTED 03/29/2017    VLDL NOT REPORTED 02/04/2015    VLDL NOT REPORTED 05/10/2013     Lab Results   Component Value Date    CHOLHDLRATIO 4.6 03/29/2017    CHOLHDLRATIO 4.7 02/04/2015    CHOLHDLRATIO 4.0 05/10/2013       Lab Results   Component Value Date    LABA1C 5.3 12/05/2018       No results found for: [id number]    No results found for: FOLATE    No results found for: IRON, TIBC, FERRITIN    No results found for: VITD25          Current Outpatient Prescriptions   Medication Sig Dispense Refill    aluminum & magnesium hydroxide-simethicone (MAALOX) 200-200-20 MG/5ML SUSP suspension Take 5 mLs by mouth every 6 hours as needed for Indigestion 1 Bottle 0    docusate sodium (COLACE) 100 MG capsule Take 1 capsule by mouth 2 times daily as needed for Constipation 60 capsule 3    omeprazole (PRILOSEC) 40 MG delayed release capsule take 1 capsule by mouth once daily 30 capsule 3    Elastic Bandages & Supports (ACE KNEE BRACE HINGED) MISC Use daily to during knee pain 1 each 0    Elastic Bandages & Supports (KNEE BRACE) MISC Use daily history of breast cancer in first degree relative  -Mammogram reprinted    5. Hyperglycemia  -Normal A1c  - POCT glycosylated hemoglobin (Hb A1C)      Orders Placed This Encounter   Procedures    US GALLBLADDER RUQ     Standing Status:   Future     Standing Expiration Date:   12/5/2019     Order Specific Question:   Reason for exam:     Answer:   EPIGASTRIC PAIN    POCT glycosylated hemoglobin (Hb A1C)         There are no discontinued medications. Nicolas Munoz received counseling on the following healthy behaviors: nutrition, exercise, medication adherence and tobacco cessation  Reviewed prior labs and health maintenance  Continue current medications, diet and exercise. Discussed use, benefit, and side effects of prescribed medications. Barriers to medication compliance addressed. Patient given educational materials - see patient instructions  Was a self-tracking handout given in paper form or via [employer]? Yes    Requested Prescriptions     Signed Prescriptions Disp Refills    aluminum & magnesium hydroxide-simethicone (MAALOX) 200-200-20 MG/5ML SUSP suspension 1 Bottle 0     Sig: Take 5 mLs by mouth every 6 hours as needed for Indigestion    docusate sodium (COLACE) 100 MG capsule 60 capsule 3     Sig: Take 1 capsule by mouth 2 times daily as needed for Constipation       All patient questions answered. Patient voiced understanding. Quality Measures    Body mass index is 35.02 kg/m². Elevated. Weight control planned discussed Healthy diet and regular exercise. BP: 105/71 Blood pressure is normal. Treatment plan consists of No treatment change needed.     Lab Results   Component Value Date    LDLCHOLESTEROL 127 03/29/2017    (goal LDL reduction with dx if diabetes is 50% LDL reduction)      PHQ Scores 12/5/2018 9/12/2017 7/14/2016   PHQ2 Score 1 0 0   PHQ9 Score 1 0 0     Interpretation of Total Score Depression Severity: 1-4 = Minimal depression, 5-9 = Mild depression, 10-14 = Moderate depression, 15-19

## 2018-12-07 ENCOUNTER — HOSPITAL ENCOUNTER (OUTPATIENT)
Dept: ULTRASOUND IMAGING | Age: 23
Discharge: HOME | End: 2018-12-09
Payer: MEDICAID

## 2018-12-07 DIAGNOSIS — R10.13 EPIGASTRIC PAIN: ICD-10-CM

## 2018-12-07 PROCEDURE — 76705 ECHO EXAM OF ABDOMEN: CPT

## 2019-01-28 ENCOUNTER — OFFICE VISIT (OUTPATIENT)
Dept: FAMILY MEDICINE CLINIC | Age: 24
End: 2019-01-28
Payer: MEDICAID

## 2019-01-28 ENCOUNTER — TELEPHONE (OUTPATIENT)
Dept: FAMILY MEDICINE CLINIC | Age: 24
End: 2019-01-28

## 2019-01-28 VITALS
WEIGHT: 174.4 LBS | HEIGHT: 59 IN | DIASTOLIC BLOOD PRESSURE: 70 MMHG | OXYGEN SATURATION: 98 % | BODY MASS INDEX: 35.16 KG/M2 | SYSTOLIC BLOOD PRESSURE: 99 MMHG | HEART RATE: 92 BPM

## 2019-01-28 DIAGNOSIS — K21.9 GASTROESOPHAGEAL REFLUX DISEASE WITHOUT ESOPHAGITIS: Primary | ICD-10-CM

## 2019-01-28 DIAGNOSIS — J45.20 MILD INTERMITTENT ASTHMA WITHOUT COMPLICATION: ICD-10-CM

## 2019-01-28 DIAGNOSIS — K21.9 GASTROESOPHAGEAL REFLUX DISEASE WITHOUT ESOPHAGITIS: ICD-10-CM

## 2019-01-28 PROCEDURE — 99214 OFFICE O/P EST MOD 30 MIN: CPT | Performed by: FAMILY MEDICINE

## 2019-01-28 RX ORDER — OMEPRAZOLE 20 MG/1
20 TABLET, DELAYED RELEASE ORAL DAILY
Qty: 30 TABLET | Refills: 3 | Status: SHIPPED | OUTPATIENT
Start: 2019-01-28 | End: 2019-05-14 | Stop reason: SDUPTHER

## 2019-01-28 RX ORDER — PPA/ASPIRIN/DIPHENHYDRAMINE
TABLET, EFFERVESCENT ORAL
Qty: 355 ML | Refills: 1 | Status: SHIPPED | OUTPATIENT
Start: 2019-01-28 | End: 2021-11-15 | Stop reason: ALTCHOICE

## 2019-01-28 ASSESSMENT — ENCOUNTER SYMPTOMS
SHORTNESS OF BREATH: 0
NAUSEA: 0
COUGH: 0
RHINORRHEA: 0
PHOTOPHOBIA: 0
BLOOD IN STOOL: 0
ABDOMINAL PAIN: 1
SINUS PRESSURE: 0
VOMITING: 0
DIARRHEA: 0
CONSTIPATION: 0
WHEEZING: 0

## 2019-01-29 ENCOUNTER — HOSPITAL ENCOUNTER (OUTPATIENT)
Age: 24
Discharge: HOME | End: 2019-01-29
Payer: COMMERCIAL

## 2019-01-29 DIAGNOSIS — K21.9 GASTROESOPHAGEAL REFLUX DISEASE WITHOUT ESOPHAGITIS: ICD-10-CM

## 2019-01-29 PROCEDURE — 87338 HPYLORI STOOL AG IA: CPT

## 2019-01-30 DIAGNOSIS — B96.81 HELICOBACTER POSITIVE GASTRITIS: Primary | ICD-10-CM

## 2019-01-30 DIAGNOSIS — K29.70 HELICOBACTER POSITIVE GASTRITIS: Primary | ICD-10-CM

## 2019-01-30 LAB
DIRECT EXAM: POSITIVE
Lab: ABNORMAL
SPECIMEN DESCRIPTION: ABNORMAL
STATUS: ABNORMAL

## 2019-01-30 RX ORDER — PANTOPRAZOLE SODIUM 20 MG/1
20 TABLET, DELAYED RELEASE ORAL 2 TIMES DAILY
Qty: 60 TABLET | Refills: 0 | Status: SHIPPED | OUTPATIENT
Start: 2019-01-30 | End: 2019-02-25 | Stop reason: ALTCHOICE

## 2019-01-30 RX ORDER — METRONIDAZOLE 250 MG/1
250 TABLET ORAL 4 TIMES DAILY
Qty: 42 TABLET | Refills: 0 | Status: SHIPPED | OUTPATIENT
Start: 2019-01-30 | End: 2019-02-13

## 2019-01-30 RX ORDER — TETRACYCLINE HYDROCHLORIDE 500 MG/1
500 CAPSULE ORAL 4 TIMES DAILY
Qty: 56 CAPSULE | Refills: 0 | Status: SHIPPED | OUTPATIENT
Start: 2019-01-30 | End: 2019-02-13

## 2019-02-05 ENCOUNTER — TELEPHONE (OUTPATIENT)
Dept: FAMILY MEDICINE CLINIC | Age: 24
End: 2019-02-05

## 2019-02-07 ENCOUNTER — TELEPHONE (OUTPATIENT)
Dept: GASTROENTEROLOGY | Age: 24
End: 2019-02-07

## 2019-02-25 ENCOUNTER — OFFICE VISIT (OUTPATIENT)
Dept: FAMILY MEDICINE CLINIC | Age: 24
End: 2019-02-25
Payer: COMMERCIAL

## 2019-02-25 VITALS
HEIGHT: 59 IN | SYSTOLIC BLOOD PRESSURE: 111 MMHG | DIASTOLIC BLOOD PRESSURE: 81 MMHG | BODY MASS INDEX: 35.4 KG/M2 | WEIGHT: 175.6 LBS | OXYGEN SATURATION: 97 % | HEART RATE: 90 BPM

## 2019-02-25 DIAGNOSIS — K21.9 GASTROESOPHAGEAL REFLUX DISEASE WITHOUT ESOPHAGITIS: ICD-10-CM

## 2019-02-25 DIAGNOSIS — B96.81 HELICOBACTER POSITIVE GASTRITIS: Primary | ICD-10-CM

## 2019-02-25 DIAGNOSIS — M79.641 PAIN OF RIGHT HAND: ICD-10-CM

## 2019-02-25 DIAGNOSIS — K29.70 HELICOBACTER POSITIVE GASTRITIS: Primary | ICD-10-CM

## 2019-02-25 PROCEDURE — 1036F TOBACCO NON-USER: CPT | Performed by: FAMILY MEDICINE

## 2019-02-25 PROCEDURE — G8427 DOCREV CUR MEDS BY ELIG CLIN: HCPCS | Performed by: FAMILY MEDICINE

## 2019-02-25 PROCEDURE — 99213 OFFICE O/P EST LOW 20 MIN: CPT | Performed by: FAMILY MEDICINE

## 2019-02-25 PROCEDURE — G8417 CALC BMI ABV UP PARAM F/U: HCPCS | Performed by: FAMILY MEDICINE

## 2019-02-25 PROCEDURE — G8484 FLU IMMUNIZE NO ADMIN: HCPCS | Performed by: FAMILY MEDICINE

## 2019-02-25 RX ORDER — LIDOCAINE 40 MG/G
CREAM TOPICAL
Qty: 45 G | Refills: 3 | Status: SHIPPED | OUTPATIENT
Start: 2019-02-25 | End: 2019-11-19

## 2019-02-25 RX ORDER — RANITIDINE 150 MG/1
150 TABLET ORAL 2 TIMES DAILY
Qty: 180 TABLET | Refills: 1 | Status: SHIPPED | OUTPATIENT
Start: 2019-02-25 | End: 2019-08-28 | Stop reason: SDUPTHER

## 2019-02-25 ASSESSMENT — ENCOUNTER SYMPTOMS
PHOTOPHOBIA: 0
DIARRHEA: 0
ABDOMINAL DISTENTION: 0
CONSTIPATION: 0
NAUSEA: 0
BACK PAIN: 0

## 2019-04-16 ENCOUNTER — OFFICE VISIT (OUTPATIENT)
Dept: GASTROENTEROLOGY | Age: 24
End: 2019-04-16
Payer: COMMERCIAL

## 2019-04-16 VITALS
BODY MASS INDEX: 35.24 KG/M2 | HEART RATE: 79 BPM | SYSTOLIC BLOOD PRESSURE: 101 MMHG | DIASTOLIC BLOOD PRESSURE: 71 MMHG | WEIGHT: 174.5 LBS

## 2019-04-16 DIAGNOSIS — K21.9 GASTROESOPHAGEAL REFLUX DISEASE, ESOPHAGITIS PRESENCE NOT SPECIFIED: Primary | ICD-10-CM

## 2019-04-16 DIAGNOSIS — F41.9 ANXIETY: ICD-10-CM

## 2019-04-16 DIAGNOSIS — K58.8 OTHER IRRITABLE BOWEL SYNDROME: ICD-10-CM

## 2019-04-16 DIAGNOSIS — R13.12 OROPHARYNGEAL DYSPHAGIA: ICD-10-CM

## 2019-04-16 PROCEDURE — 99244 OFF/OP CNSLTJ NEW/EST MOD 40: CPT | Performed by: INTERNAL MEDICINE

## 2019-04-16 PROCEDURE — G8417 CALC BMI ABV UP PARAM F/U: HCPCS | Performed by: INTERNAL MEDICINE

## 2019-04-16 PROCEDURE — G8427 DOCREV CUR MEDS BY ELIG CLIN: HCPCS | Performed by: INTERNAL MEDICINE

## 2019-04-16 ASSESSMENT — ENCOUNTER SYMPTOMS
VOMITING: 0
NAUSEA: 0
CHOKING: 0
SORE THROAT: 0
BLOOD IN STOOL: 0
RECTAL PAIN: 0
BACK PAIN: 1
SINUS PRESSURE: 0
DIARRHEA: 0
ALLERGIC/IMMUNOLOGIC NEGATIVE: 1
CONSTIPATION: 0
TROUBLE SWALLOWING: 1
ANAL BLEEDING: 0
ABDOMINAL PAIN: 1
RESPIRATORY NEGATIVE: 1
ABDOMINAL DISTENTION: 1
COUGH: 0
VOICE CHANGE: 0
WHEEZING: 0

## 2019-04-16 NOTE — PROGRESS NOTES
Subjective:      Patient ID: Thiago Rodriguez is a 24 y.o. female. HPI    Dr. Sanchez Pack MD has requested that I see Thiago Rodriguez for a consult for   1. Gastroesophageal reflux disease, esophagitis presence not specified    2. Other irritable bowel syndrome    3. Anxiety    4. Oropharyngeal dysphagia     . This patient is seen in my office for the first time  She has been seen by Dr Jordan Simmons more than 3 years ago  She has hx of GERD  Has been having some issues with swallowing for a long time too  She has no smoking  No ETOH  Patient has been complaining of some abdominal pains, off and on cramping  Also complains of abdominal bloating and gas  Has off and on nausea without any sig vomiting  Has some alternating constipation and diarrhea  Has no weight loss  Has some anxiety issues  No bleeding  No melena    Past Medical, Family, and Social History reviewed and does contribute to the patient presenting condition. patient\"s PMH/PSH,SH,PSYCH hx, MEDs, ALLERGIES, and ROS was all reviewed and updated ion the appropriate sections    Review of Systems   Constitutional: Negative. Negative for appetite change, fatigue and unexpected weight change. HENT: Positive for trouble swallowing. Negative for dental problem, postnasal drip, sinus pressure, sore throat and voice change. Eyes: Positive for visual disturbance. Respiratory: Negative. Negative for cough, choking and wheezing. Cardiovascular: Negative. Negative for chest pain, palpitations and leg swelling. Gastrointestinal: Positive for abdominal distention and abdominal pain. Negative for anal bleeding, blood in stool, constipation, diarrhea, nausea, rectal pain and vomiting. Endocrine: Negative. Genitourinary: Negative. Negative for difficulty urinating. Musculoskeletal: Positive for arthralgias and back pain. Negative for gait problem and myalgias. Skin: Negative. Allergic/Immunologic: Negative.   Negative for environmental allergies and food allergies. Neurological: Negative. Negative for dizziness, weakness, light-headedness, numbness and headaches. Hematological: Negative. Does not bruise/bleed easily. Psychiatric/Behavioral: Negative. Negative for sleep disturbance. The patient is not nervous/anxious. Reviewed and agree  Objective:   Physical Exam   Constitutional: She is oriented to person, place, and time. She appears well-developed and well-nourished. Anxious    HENT:   Head: Normocephalic and atraumatic. Mouth/Throat: Oropharynx is clear and moist.   Eyes: Pupils are equal, round, and reactive to light. Conjunctivae are normal.   Neck: Normal range of motion. Neck supple. Cardiovascular: Normal heart sounds and intact distal pulses. Pulmonary/Chest: Effort normal and breath sounds normal.   Abdominal: Soft. Bowel sounds are normal.   NON TENDER, NON DISTENTED  LIVER SPLEEN AND HERNIAS ARE NOT  PALPABLE  BOWEL SOUNDS ARE POSITIVE      Musculoskeletal: Normal range of motion. Neurological: She is alert and oriented to person, place, and time. Skin: Skin is warm. Psychiatric: Her behavior is normal.   Vitals reviewed.       Assessment:      Patient Active Problem List   Diagnosis    ADHD (attention deficit hyperactivity disorder)    Menometrorrhagia    GERD (gastroesophageal reflux disease)    Vision abnormalities    Family history of breast cancer in first degree relative    Herpes simplex vulvovaginitis II    HPV (human papilloma virus) infection    Mild intermittent asthma without complication    Anxiety    Major depressive disorder with single episode, in partial remission (HCC)    Chronic pain of right knee    Helicobacter positive gastritis     IBS  GERD      Plan:      QUIT SODA    QUIT FAST FOODS    Plan EGD    The Endoscopic procedure was explained to the patient in detail  The prep and NPO were explained  All the Risks, Benefits, and Alternatives were explained  Risk of Bleeding, Perforation and Cardio Respiratory risks were explained  her questions were answered  The procedure has been scheduled with the  in the office  Patient was asked to give us a call for any questions  The patient has verbalized understanding and agreement to this plan. Pt seems to have signs and symptoms consistent with GERD, acid indigestion and heartburns. She was discussed  in detail about some possible life style and dietary modifications. She was stressed about the maintenance  of appropriate weight and effect of obesity contributing to reflux symptoms. Routine exercise was streesed. Avoidance of Caffeine, nicotine and chocolate were explained. Pt was asked to avoid spices grease and fried food. Advices were also given about avoidance of any kind of fast foods, soda pops and high energy drinks. Pt was advised to place two small block under the head end of the bed which may help with night time reflux. Was advised not to eat any thin at least 2-3 hrs before going to bed and walk especially after dinner    Pt has verbalized understanding and agreement to this plan.     The patient was instructed to start taking some OTC Probiotics products   These are available over the counter at the Pharmacy stores and Grocery stores  He was explained about the beneficial effects they have in the GI track  They will help to establish the good bacterial kriss and will help with the digestion and bowel movements  The patient has verbalized understanding and agreement to this plan    More than half of patient's clinic visit time was spent in counseling about lifestyle and dietary modifications  Patient's  questions were answered in this regard as well  The patient has verbalized understanding and agreement

## 2019-04-23 ENCOUNTER — HOSPITAL ENCOUNTER (OUTPATIENT)
Age: 24
Discharge: HOME | End: 2019-04-23
Payer: COMMERCIAL

## 2019-04-23 LAB
GLUCOSE BLD-MCNC: 114 MG/DL (ref 70–99)
INSULIN COMMENT: NORMAL
INSULIN REFERENCE RANGE:: NORMAL
INSULIN: 27.6 MU/L
TSH SERPL DL<=0.05 MIU/L-ACNC: 2.84 MIU/L (ref 0.3–5)

## 2019-04-23 PROCEDURE — 83525 ASSAY OF INSULIN: CPT

## 2019-04-23 PROCEDURE — 84443 ASSAY THYROID STIM HORMONE: CPT

## 2019-04-23 PROCEDURE — 82947 ASSAY GLUCOSE BLOOD QUANT: CPT

## 2019-04-23 PROCEDURE — 36415 COLL VENOUS BLD VENIPUNCTURE: CPT

## 2019-05-14 DIAGNOSIS — K21.9 GASTROESOPHAGEAL REFLUX DISEASE WITHOUT ESOPHAGITIS: ICD-10-CM

## 2019-05-14 RX ORDER — OMEPRAZOLE 20 MG/1
CAPSULE, DELAYED RELEASE ORAL
Qty: 30 CAPSULE | Refills: 3 | Status: SHIPPED | OUTPATIENT
Start: 2019-05-14 | End: 2020-01-13

## 2019-05-14 NOTE — TELEPHONE ENCOUNTER
Please Approve or Refuse.   Send to Pharmacy per Pt's Request:      Next Visit Date:  5/23/2019   Last Visit Date: 2/25/2019    Hemoglobin A1C (%)   Date Value   12/05/2018 5.3   03/29/2017 5.3   03/21/2017 5.2             ( goal A1C is < 7)   BP Readings from Last 3 Encounters:   04/16/19 101/71   02/25/19 111/81   01/28/19 99/70          (goal 120/80)  BUN   Date Value Ref Range Status   03/29/2017 11 6 - 20 mg/dL Final     CREATININE   Date Value Ref Range Status   03/29/2017 0.53 0.50 - 0.90 mg/dL Final     Potassium   Date Value Ref Range Status   03/29/2017 4.7 3.7 - 5.3 mmol/L Final

## 2019-05-15 ENCOUNTER — HOSPITAL ENCOUNTER (OUTPATIENT)
Age: 24
Discharge: HOME | End: 2019-05-15
Payer: COMMERCIAL

## 2019-05-15 LAB
GLUCOSE BLD-MCNC: 99 MG/DL (ref 70–99)
GLUCOSE TOLERANCE TEST 2 HOUR: 89 MG/DL (ref 60–140)

## 2019-05-15 PROCEDURE — 36415 COLL VENOUS BLD VENIPUNCTURE: CPT

## 2019-05-15 PROCEDURE — 82947 ASSAY GLUCOSE BLOOD QUANT: CPT

## 2019-05-23 ENCOUNTER — OFFICE VISIT (OUTPATIENT)
Dept: FAMILY MEDICINE CLINIC | Age: 24
End: 2019-05-23
Payer: COMMERCIAL

## 2019-05-23 VITALS
DIASTOLIC BLOOD PRESSURE: 70 MMHG | OXYGEN SATURATION: 97 % | WEIGHT: 175.8 LBS | BODY MASS INDEX: 35.44 KG/M2 | HEART RATE: 92 BPM | HEIGHT: 59 IN | SYSTOLIC BLOOD PRESSURE: 111 MMHG

## 2019-05-23 DIAGNOSIS — K21.9 GASTROESOPHAGEAL REFLUX DISEASE WITHOUT ESOPHAGITIS: ICD-10-CM

## 2019-05-23 DIAGNOSIS — M79.641 PAIN OF RIGHT HAND: Primary | ICD-10-CM

## 2019-05-23 DIAGNOSIS — E66.09 CLASS 2 OBESITY DUE TO EXCESS CALORIES WITHOUT SERIOUS COMORBIDITY WITH BODY MASS INDEX (BMI) OF 35.0 TO 35.9 IN ADULT: ICD-10-CM

## 2019-05-23 PROCEDURE — 99213 OFFICE O/P EST LOW 20 MIN: CPT | Performed by: FAMILY MEDICINE

## 2019-05-23 PROCEDURE — G8427 DOCREV CUR MEDS BY ELIG CLIN: HCPCS | Performed by: FAMILY MEDICINE

## 2019-05-23 PROCEDURE — G8417 CALC BMI ABV UP PARAM F/U: HCPCS | Performed by: FAMILY MEDICINE

## 2019-05-23 PROCEDURE — 1036F TOBACCO NON-USER: CPT | Performed by: FAMILY MEDICINE

## 2019-05-23 ASSESSMENT — PATIENT HEALTH QUESTIONNAIRE - PHQ9
SUM OF ALL RESPONSES TO PHQ QUESTIONS 1-9: 0
SUM OF ALL RESPONSES TO PHQ QUESTIONS 1-9: 0
1. LITTLE INTEREST OR PLEASURE IN DOING THINGS: 0
2. FEELING DOWN, DEPRESSED OR HOPELESS: 0
SUM OF ALL RESPONSES TO PHQ9 QUESTIONS 1 & 2: 0

## 2019-05-23 ASSESSMENT — ENCOUNTER SYMPTOMS
ABDOMINAL PAIN: 0
ABDOMINAL DISTENTION: 0

## 2019-05-23 NOTE — PROGRESS NOTES
Chief Complaint   Patient presents with    Hand Pain    Other     Follow up with gastritis         Mckenna Louis  here today for follow up on chronic medical problems, go over labs and/or diagnostic studies, and medication refills. Hand Pain and Other (Follow up with gastritis)      HPI: Patient is here for follow-up for hand pain reports her pain has resolved, she is using lidocaine and NSAIDs as needed. History of H. pylori and GERD is planning to have EGD next month. Patient reports she is concerned about her weight, and she is trying to lose weight is watching her diet. Not physically active. Is interested in weight loss medications. /70   Pulse 92   Ht 4' 11\" (1.499 m)   Wt 175 lb 12.8 oz (79.7 kg)   SpO2 97%   BMI 35.51 kg/m²    Body mass index is 35.51 kg/m². Wt Readings from Last 3 Encounters:   05/23/19 175 lb 12.8 oz (79.7 kg)   04/16/19 174 lb 8 oz (79.2 kg)   02/25/19 175 lb 9.6 oz (79.7 kg)        [x]Negative depression screening. PHQ Scores 5/23/2019 12/5/2018 9/12/2017 7/14/2016   PHQ2 Score 0 1 0 0   PHQ9 Score 0 1 0 0      []1-4 = Minimal depression   []5-9 = Milddepression   []10-14 = Moderate depression   []15-19 = Moderately severe depression   []20-27 = Severe depression    Discussed testing with the patient and all questions fully answered.     Hospital Outpatient Visit on 05/15/2019   Component Date Value Ref Range Status    Glucose 05/15/2019 99  70 - 99 mg/dL Final    Glucose, GTT - 2 Hour 05/15/2019 89  60 - 140 mg/dL Final         Most recent labs reviewed:     Lab Results   Component Value Date    WBC 12.5 (H) 03/29/2017    HGB 13.2 03/29/2017    HCT 40.6 03/29/2017    MCV 79.0 (L) 03/29/2017     03/29/2017       @BRIEFLAB(NA,K,CL,CO2,BUN,CREATININE,GLUCOSE,CALCIUM)@     Lab Results   Component Value Date    ALT 25 03/29/2017    AST 23 03/29/2017    ALKPHOS 82 03/29/2017    BILITOT 0.20 (L) 03/29/2017       Lab Results   Component Value Date    TSH 2.84 04/23/2019       Lab Results   Component Value Date    CHOL 190 03/29/2017    CHOL 186 02/04/2015    CHOL 170 05/10/2013     Lab Results   Component Value Date    TRIG 108 03/29/2017    TRIG 140 02/04/2015    TRIG 179 (H) 05/10/2013     Lab Results   Component Value Date    HDL 41 03/29/2017    HDL 40 (L) 02/04/2015    HDL 42 05/10/2013     Lab Results   Component Value Date    LDLCHOLESTEROL 127 03/29/2017    LDLCHOLESTEROL 118 02/04/2015    LDLCHOLESTEROL 92 05/10/2013     Lab Results   Component Value Date    VLDL NOT REPORTED 03/29/2017    VLDL NOT REPORTED 02/04/2015    VLDL NOT REPORTED 05/10/2013     Lab Results   Component Value Date    CHOLHDLRATIO 4.6 03/29/2017    CHOLHDLRATIO 4.7 02/04/2015    CHOLHDLRATIO 4.0 05/10/2013       Lab Results   Component Value Date    LABA1C 5.3 12/05/2018       No results found for: XXSCVTKR29    No results found for: FOLATE    No results found for: IRON, TIBC, FERRITIN    No results found for: VITD25          Current Outpatient Medications   Medication Sig Dispense Refill    omeprazole (PRILOSEC) 20 MG delayed release capsule take 1 capsule by mouth once daily 30 capsule 3    ranitidine (ZANTAC) 150 MG tablet Take 1 tablet by mouth 2 times daily 180 tablet 1    lidocaine (LMX) 4 % cream Apply topically every 8 hrs as needed for pain 45 g 3    albuterol sulfate  (90 Base) MCG/ACT inhaler Inhale 2 puffs into the lungs 4 times daily as needed for Wheezing 3 Inhaler 3    VENTOLIN  (90 Base) MCG/ACT inhaler inhale 2 puffs by mouth every 6 hours if needed for wheezing 18 g 3    RA ANTACID/ANTI--200-20 MG/5ML SUSP suspension take 5 milliliters by mouth every 6 hours if needed for indigestion 355 mL 1    docusate sodium (COLACE) 100 MG capsule Take 1 capsule by mouth 2 times daily as needed for Constipation 60 capsule 3    Elastic Bandages & Supports (ACE KNEE BRACE HINGED) MISC Use daily to during knee pain 1 each 0    Elastic Bandages & Supports (KNEE BRACE) MISC Use daily during knee pain 1 each 0    FLOVENT  MCG/ACT inhaler inhale 1 puff by mouth twice a day 12 g 3     No current facility-administered medications for this visit.               Social History     Socioeconomic History    Marital status: Single     Spouse name: Not on file    Number of children: Not on file    Years of education: Not on file    Highest education level: Not on file   Occupational History    Not on file   Social Needs    Financial resource strain: Not on file    Food insecurity:     Worry: Not on file     Inability: Not on file    Transportation needs:     Medical: Not on file     Non-medical: Not on file   Tobacco Use    Smoking status: Former Smoker     Last attempt to quit: 2012     Years since quittin.6    Smokeless tobacco: Never Used   Substance and Sexual Activity    Alcohol use: No    Drug use: No    Sexual activity: Yes     Partners: Male   Lifestyle    Physical activity:     Days per week: Not on file     Minutes per session: Not on file    Stress: Not on file   Relationships    Social connections:     Talks on phone: Not on file     Gets together: Not on file     Attends [employer] service: Not on file     Active member of club or organization: Not on file     Attends meetings of clubs or organizations: Not on file     Relationship status: Not on file    Intimate partner violence:     Fear of current or ex partner: Not on file     Emotionally abused: Not on file     Physically abused: Not on file     Forced sexual activity: Not on file   Other Topics Concern    Not on file   Social History Narrative    Not on file     Counseling given: No        Family History   Problem Relation Age of Onset    Depression Mother     Cancer Paternal Aunt     Cancer Paternal Grandmother         breast    Diabetes Paternal Grandmother     Cancer Paternal Grandfather         lung             -rest of complaints with corresponding details per ROS    The patient's past medical, surgical, social, and family history as well as her current medications and allergies were reviewed as documented intoday's encounter. Review of Systems   Constitutional: Positive for unexpected weight change. Negative for activity change, appetite change and fever. Eyes: Negative for visual disturbance. Cardiovascular: Negative for chest pain and leg swelling. Gastrointestinal: Negative for abdominal distention and abdominal pain. Endocrine: Negative for polyphagia and polyuria. Musculoskeletal: Positive for arthralgias. Hand pain   Hematological: Negative for adenopathy. Psychiatric/Behavioral: Negative for agitation and decreased concentration. The patient is not nervous/anxious. Physical Exam    PHYSICAL EXAM:   VITALS:   Vitals:    05/23/19 1156   BP: 111/70   Pulse: 92   SpO2: 97%     GENERAL:  Patient is a well-developed, well-nourished female  in no acute distress, alert and oriented x3, appropriate and pleasant conversation. HEAD: Normocephalic, atraumatic. NECK: Supple. No masses. No lymphadenopathy. CARDIOVASCULAR: Regular rate and rhythm. PULMONARY: Lungs are clear to auscultation bilaterally. ABDOMEN: Soft, nontender, nondistended. Positive bowel sounds. MUSCULOSKELETAL: Strength 5/5 bilaterally in all extremities. No tenderness to   palpation of the ribs, long bones, or spine. ASSESSMENT AND PLAN      1. Pain of right hand  Pain has resolved continue NSAIDs as needed    2. Gastroesophageal reflux disease without esophagitis  EGD planned next month    3. Class 2 obesity due to excess calories without serious comorbidity with body mass index (BMI) of 35.0 to 35.9 in adult  Discussed with patient we can discuss in detail about the medications in next appointment. No orders of the defined types were placed in this encounter. There are no discontinued medications.     Kwan received counseling on the following healthy behaviors: nutrition, exercise, medication adherence and tobacco cessation  Reviewed prior labs and health maintenance  Continue current medications, diet and exercise. Discussed use, benefit, and side effects of prescribed medications. Barriers to medication compliance addressed. Patient given educational materials - see patient instructions  Was a self-tracking handout given in paper form or via [employer]? Yes    Requested Prescriptions      No prescriptions requested or ordered in this encounter       All patient questions answered. Patient voiced understanding. Quality Measures    Body mass index is 35.51 kg/m². Elevated. Weight control planned discussed Healthy diet and regular exercise. BP: 111/70 Blood pressure is normal. Treatment plan consists of No treatment change needed. Lab Results   Component Value Date    LDLCHOLESTEROL 127 03/29/2017    (goal LDL reduction with dx if diabetes is 50% LDL reduction)      PHQ Scores 5/23/2019 12/5/2018 9/12/2017 7/14/2016   PHQ2 Score 0 1 0 0   PHQ9 Score 0 1 0 0     Interpretation of Total Score Depression Severity: 1-4 = Minimal depression, 5-9 = Mild depression, 10-14 = Moderate depression, 15-19 = Moderately severe depression, 20-27 = Severe depression    The patient'spast medical, surgical, social, and family history as well as her   current medications and allergies were reviewed as documented in today's encounter. Medications, labs, diagnostic studies, consultations andfollow-up as documented in this encounter. Return in about 1 month (around 6/20/2019) for weightloos . Patient wasseen with total face to face time of 15 minutes. More than 50% of this visit was counseling and education.        Future Appointments   Date Time Provider Samantha Lehman   6/19/2019 12:15 PM [facility]   7/16/2019  2:15 PM [facility]     This note was completed by using the assistance of a

## 2019-06-12 ENCOUNTER — TELEPHONE (OUTPATIENT)
Dept: GASTROENTEROLOGY | Age: 24
End: 2019-06-12

## 2019-06-12 NOTE — TELEPHONE ENCOUNTER
Spoke with Pt and confirmed her procedure scheduled at [facility] Tuesday June 18 @12:00pm arrival 10am pt has a

## 2019-06-17 NOTE — TELEPHONE ENCOUNTER
Spoke with pt, due to cancellations time for procedure 6/18/19 will be moved up, @11:15am w/9:15am arrival  Pt is ok with this change and all set with prep instrucitons

## 2019-06-18 ENCOUNTER — HOSPITAL ENCOUNTER (OUTPATIENT)
Age: 24
Setting detail: OUTPATIENT SURGERY
Discharge: HOME | End: 2019-06-18
Attending: INTERNAL MEDICINE | Admitting: INTERNAL MEDICINE
Payer: COMMERCIAL

## 2019-06-18 LAB
-: ABNORMAL
HCG QUALITATIVE: POSITIVE
HCG, PREGNANCY URINE (POC): POSITIVE

## 2019-06-18 PROCEDURE — 81025 URINE PREGNANCY TEST: CPT

## 2019-06-18 PROCEDURE — 84703 CHORIONIC GONADOTROPIN ASSAY: CPT

## 2019-06-18 PROCEDURE — 36415 COLL VENOUS BLD VENIPUNCTURE: CPT

## 2019-06-18 RX ORDER — SODIUM CHLORIDE, SODIUM LACTATE, POTASSIUM CHLORIDE, CALCIUM CHLORIDE 600; 310; 30; 20 MG/100ML; MG/100ML; MG/100ML; MG/100ML
INJECTION, SOLUTION INTRAVENOUS CONTINUOUS
Status: DISCONTINUED | OUTPATIENT
Start: 2019-06-18 | End: 2019-07-25 | Stop reason: HOSPADM

## 2019-06-19 ENCOUNTER — OFFICE VISIT (OUTPATIENT)
Dept: FAMILY MEDICINE CLINIC | Age: 24
End: 2019-06-19
Payer: COMMERCIAL

## 2019-06-19 VITALS
BODY MASS INDEX: 34.96 KG/M2 | HEIGHT: 59 IN | SYSTOLIC BLOOD PRESSURE: 106 MMHG | WEIGHT: 173.4 LBS | DIASTOLIC BLOOD PRESSURE: 74 MMHG | HEART RATE: 102 BPM | OXYGEN SATURATION: 97 %

## 2019-06-19 DIAGNOSIS — Z00.00 PREVENTATIVE HEALTH CARE: ICD-10-CM

## 2019-06-19 DIAGNOSIS — E66.09 CLASS 2 OBESITY DUE TO EXCESS CALORIES WITHOUT SERIOUS COMORBIDITY WITH BODY MASS INDEX (BMI) OF 35.0 TO 35.9 IN ADULT: Primary | ICD-10-CM

## 2019-06-19 PROCEDURE — G8417 CALC BMI ABV UP PARAM F/U: HCPCS | Performed by: FAMILY MEDICINE

## 2019-06-19 PROCEDURE — 1036F TOBACCO NON-USER: CPT | Performed by: FAMILY MEDICINE

## 2019-06-19 PROCEDURE — 99213 OFFICE O/P EST LOW 20 MIN: CPT | Performed by: FAMILY MEDICINE

## 2019-06-19 PROCEDURE — G8427 DOCREV CUR MEDS BY ELIG CLIN: HCPCS | Performed by: FAMILY MEDICINE

## 2019-06-19 ASSESSMENT — ENCOUNTER SYMPTOMS
CONSTIPATION: 0
SHORTNESS OF BREATH: 0
WHEEZING: 0
PHOTOPHOBIA: 0
ABDOMINAL PAIN: 0
BLOOD IN STOOL: 0

## 2019-06-19 NOTE — PROGRESS NOTES
Chief Complaint   Patient presents with    Weight Loss         Tasia Landon  here today for follow up on chronic medical problems, go over labs and/or diagnostic studies, and medication refills. Weight Loss      HPI: Patient is here to discuss weight, she was supposed to discuss obesity and try weight loss medications. Patient is pregnant and was found yesterday. Patient was supposed to have EGD yesterday but her urine test was positive for pregnancy. This is her first pregnancy and has scheduled appointment with OB/GYN. /74   Pulse 102   Ht 4' 11\" (1.499 m)   Wt 173 lb 6.4 oz (78.7 kg)   LMP 05/19/2019 (Exact Date)   SpO2 97%   BMI 35.02 kg/m²    Body mass index is 35.02 kg/m². Wt Readings from Last 3 Encounters:   06/19/19 173 lb 6.4 oz (78.7 kg)   05/23/19 175 lb 12.8 oz (79.7 kg)   04/16/19 174 lb 8 oz (79.2 kg)        [x]Negative depression screening. PHQ Scores 5/23/2019 12/5/2018 9/12/2017 7/14/2016   PHQ2 Score 0 1 0 0   PHQ9 Score 0 1 0 0      []1-4 = Minimal depression   []5-9 = Milddepression   []10-14 = Moderate depression   []15-19 = Moderately severe depression   []20-27 = Severe depression    Discussed testing with the patient and all questions fully answered. Admission on 06/18/2019   Component Date Value Ref Range Status    hCG Qual 06/18/2019 POSITIVE* NEGATIVE Final    Comment: If HCG results do not concur with clinical observations, additional testing to confirm   result is recommended. This test is not labeled for use as a tumor marker.  HCG, Pregnancy Urine (POC) 06/18/2019 POSITIVE* NEGATIVE Corrected    Comment:    HCG screen is sensitive to 25 mIU/mL. However this test may  lower levels  of HCG. If further evaluation is needed  please request quantitative HCG. CORRECTED ON 06/18 AT 1231: PREVIOUSLY REPORTED AS NEGATIVE   HCG screen is sensitive to 25   mIU/mL. However this test may  lower levels of HCG.   If further evaluation is needed please request quantitative HCG.  - 06/18/2019 NOT REPORTED   Final         Most recent labs reviewed:     Lab Results   Component Value Date    WBC 12.5 (H) 03/29/2017    HGB 13.2 03/29/2017    HCT 40.6 03/29/2017    MCV 79.0 (L) 03/29/2017     03/29/2017       @BRIEFLAB(NA,K,CL,CO2,BUN,CREATININE,GLUCOSE,CALCIUM)@     Lab Results   Component Value Date    ALT 25 03/29/2017    AST 23 03/29/2017    ALKPHOS 82 03/29/2017    BILITOT 0.20 (L) 03/29/2017       Lab Results   Component Value Date    TSH 2.84 04/23/2019       Lab Results   Component Value Date    CHOL 190 03/29/2017    CHOL 186 02/04/2015    CHOL 170 05/10/2013     Lab Results   Component Value Date    TRIG 108 03/29/2017    TRIG 140 02/04/2015    TRIG 179 (H) 05/10/2013     Lab Results   Component Value Date    HDL 41 03/29/2017    HDL 40 (L) 02/04/2015    HDL 42 05/10/2013     Lab Results   Component Value Date    LDLCHOLESTEROL 127 03/29/2017    LDLCHOLESTEROL 118 02/04/2015    LDLCHOLESTEROL 92 05/10/2013     Lab Results   Component Value Date    VLDL NOT REPORTED 03/29/2017    VLDL NOT REPORTED 02/04/2015    VLDL NOT REPORTED 05/10/2013     Lab Results   Component Value Date    CHOLHDLRATIO 4.6 03/29/2017    CHOLHDLRATIO 4.7 02/04/2015    CHOLHDLRATIO 4.0 05/10/2013       Lab Results   Component Value Date    LABA1C 5.3 12/05/2018       No results found for: [id number]    No results found for: FOLATE    No results found for: IRON, TIBC, FERRITIN    No results found for: VITD25          No current facility-administered medications for this visit. No current outpatient medications on file.      Facility-Administered Medications Ordered in Other Visits   Medication Dose Route Frequency Provider Last Rate Last Dose    lactated ringers infusion   Intravenous Continuous Angela Quinn MD                 Social History     Socioeconomic History    Marital status: Single     Spouse name: Not on file    Number of children: Not on file    Years of education: Not on file    Highest education level: Not on file   Occupational History    Not on file   Social Needs    Financial resource strain: Not on file    Food insecurity:     Worry: Not on file     Inability: Not on file    Transportation needs:     Medical: Not on file     Non-medical: Not on file   Tobacco Use    Smoking status: Former Smoker     Last attempt to quit: 2012     Years since quittin.7    Smokeless tobacco: Never Used   Substance and Sexual Activity    Alcohol use: No    Drug use: No    Sexual activity: Yes     Partners: Male   Lifestyle    Physical activity:     Days per week: Not on file     Minutes per session: Not on file    Stress: Not on file   Relationships    Social connections:     Talks on phone: Not on file     Gets together: Not on file     Attends Synagogue service: Not on file     Active member of club or organization: Not on file     Attends meetings of clubs or organizations: Not on file     Relationship status: Not on file    Intimate partner violence:     Fear of current or ex partner: Not on file     Emotionally abused: Not on file     Physically abused: Not on file     Forced sexual activity: Not on file   Other Topics Concern    Not on file   Social History Narrative    Not on file     Counseling given: Not Answered        Family History   Problem Relation Age of Onset    Depression Mother     Cancer Paternal Aunt     Cancer Paternal Grandmother         breast    Diabetes Paternal Grandmother     Cancer Paternal Grandfather         lung             -rest of complaints with corresponding details per ROS    The patient's past medical, surgical, social, and family history as well as her current medications and allergies were reviewed as documented intoday's encounter. Review of Systems   Constitutional: Positive for unexpected weight change. Negative for fever. HENT: Negative for congestion.     Eyes: Negative for photophobia and visual disturbance. Respiratory: Negative for shortness of breath and wheezing. Cardiovascular: Negative for chest pain and palpitations. Gastrointestinal: Negative for abdominal pain, blood in stool and constipation. Neurological: Negative for dizziness, weakness and headaches. Psychiatric/Behavioral: Negative for agitation, decreased concentration and dysphoric mood. The patient is not nervous/anxious. Physical Exam    PHYSICAL EXAM:   VITALS:   Vitals:    06/19/19 1221   BP: 106/74   Pulse: 102   SpO2: 97%     GENERAL:  Patient is a well-developed, well-nourished female  in no acute distress, alert and oriented x3, appropriate and pleasant conversation. HEAD: Normocephalic, atraumatic. EYES: Pupils equal, round and reactive to light and accommodation, extraocular   movements intact. ENT: Moist mucous membranes. No erythema is noted. NECK: Supple. No masses. No lymphadenopathy. CARDIOVASCULAR: Regular rate and rhythm. PULMONARY: Lungs are clear to auscultation bilaterally. ABDOMEN: Soft, nontender, nondistended. Positive bowel sounds. MUSCULOSKELETAL: Strength 5/5 bilaterally in all extremities. No tenderness to   palpation of the ribs, long bones, or spine. NEUROLOGIC: Cranial nerves II through XII grossly intact. No focal deficits are noted. ASSESSMENT AND PLAN      1. Class 2 obesity due to excess calories without serious comorbidity with body mass index (BMI) of 35.0 to 35.9 in adult  Discussed with patient that we cannot put on any weight loss medications during pregnancy, we can discuss after pregnancy    2. Preventative health care    - Chlamydia, DNA, Urine; Future      Orders Placed This Encounter   Procedures    Chlamydia, DNA, Urine     Standing Status:   Future     Standing Expiration Date:   6/19/2020         There are no discontinued medications.     Jeremiah Harvey received counseling on the following healthy behaviors: nutrition, exercise, medication adherence and tobacco cessation  Reviewed prior labs and health maintenance  Continue current medications, diet and exercise. Discussed use, benefit, and side effects of prescribed medications. Barriers to medication compliance addressed. Patient given educational materials - see patient instructions  Was a self-tracking handout given in paper form or via [employer]? Yes    Requested Prescriptions      No prescriptions requested or ordered in this encounter       All patient questions answered. Patient voiced understanding. Quality Measures    Body mass index is 35.02 kg/m². Elevated. Weight control planned discussed Healthy diet and regular exercise. BP: 106/74 Blood pressure is normal. Treatment plan consists of No treatment change needed. Lab Results   Component Value Date    LDLCHOLESTEROL 127 03/29/2017    (goal LDL reduction with dx if diabetes is 50% LDL reduction)      PHQ Scores 5/23/2019 12/5/2018 9/12/2017 7/14/2016   PHQ2 Score 0 1 0 0   PHQ9 Score 0 1 0 0     Interpretation of Total Score Depression Severity: 1-4 = Minimal depression, 5-9 = Mild depression, 10-14 = Moderate depression, 15-19 = Moderately severe depression, 20-27 = Severe depression    The patient'spast medical, surgical, social, and family history as well as her   current medications and allergies were reviewed as documented in today's encounter. Medications, labs, diagnostic studies, consultations andfollow-up as documented in this encounter. Return if symptoms worsen or fail to improve. Patient wasseen with total face to face time of 15 minutes. More than 50% of this visit was counseling and education. Future Appointments   Date Time Provider Samantha Lehman   7/16/2019  2:15 PM Srinivas Patrick MD [facility]     This note was completed by using the assistance of a speech-recognition program. However, inadvertent computerized transcription errors may be present.  Althoughevery effort was made to ensure accuracy, no guarantees can be provided that every mistake has been identified and corrected by editing.   Electronically signed by Foster Blackwood MD on 6/19/2019  1:08 PM

## 2019-06-19 NOTE — PROGRESS NOTES
Visit Information    Have you changed or started any medications since your last visit including any over-the-counter medicines, vitamins, or herbal medicines? no   Are you having any side effects from any of your medications? -  no  Have you stopped taking any of your medications? Is so, why? -  no    Have you seen any other physician or provider since your last visit? No  Have you had any other diagnostic tests since your last visit? No  Have you been seen in the emergency room and/or had an admission to a hospital since we last saw you? No  Have you had your routine dental cleaning in the past 6 months? No declined    Have you activated your [employer] account? If not, what are your barriers?  No:      Patient Care Team:  Shemar Gore MD as PCP - General (Family Medicine)  Shemar Gore MD as PCP - [facility]  Meño Denney MD as Consulting Physician (Gastroenterology)  Claudia Yip DO as Consulting Physician (Obstetrics & Gynecology)    Medical History Review  Past Medical, Family, and Social History reviewed and does contribute to the patient presenting condition    Health Maintenance   Topic Date Due    Chlamydia screen  09/12/2018    Flu vaccine (Season Ended) 10/01/2019 (Originally 9/1/2019)    Pneumococcal 0-64 years Vaccine (1 of 1 - PPSV23) 11/23/2019 (Originally 6/6/2000)    Varicella Vaccine (1 of 2 - 13+ 2-dose series) 05/23/2020 (Originally 6/6/2007)    Cervical cancer screen  09/12/2020    DTaP/Tdap/Td vaccine (7 - Td) 09/29/2020    HPV vaccine  Completed    HIV screen  Completed

## 2019-08-28 DIAGNOSIS — K21.9 GASTROESOPHAGEAL REFLUX DISEASE WITHOUT ESOPHAGITIS: ICD-10-CM

## 2019-08-28 DIAGNOSIS — K29.70 HELICOBACTER POSITIVE GASTRITIS: ICD-10-CM

## 2019-08-28 DIAGNOSIS — B96.81 HELICOBACTER POSITIVE GASTRITIS: ICD-10-CM

## 2019-08-28 RX ORDER — RANITIDINE 150 MG/1
TABLET ORAL
Qty: 180 TABLET | Refills: 1 | Status: SHIPPED | OUTPATIENT
Start: 2019-08-28 | End: 2019-12-05

## 2019-10-27 ENCOUNTER — HOSPITAL ENCOUNTER (INPATIENT)
Age: 24
LOS: 2 days | Discharge: HOME | DRG: 566 | End: 2019-10-30
Attending: OBSTETRICS & GYNECOLOGY | Admitting: OBSTETRICS & GYNECOLOGY
Payer: COMMERCIAL

## 2019-10-27 PROBLEM — N83.201 RIGHT OVARIAN CYST: Status: ACTIVE | Noted: 2019-10-27

## 2019-10-27 PROBLEM — Z86.19 HX OF HELICOBACTER INFECTION: Status: ACTIVE | Noted: 2019-01-30

## 2019-10-27 PROBLEM — M79.641 PAIN OF RIGHT HAND: Status: RESOLVED | Noted: 2019-05-23 | Resolved: 2019-10-27

## 2019-10-27 PROBLEM — Z82.79 FAMILY HISTORY OF SPINA BIFIDA: Status: ACTIVE | Noted: 2019-10-27

## 2019-10-27 PROBLEM — Z3A.23 23 WEEKS GESTATION OF PREGNANCY: Status: ACTIVE | Noted: 2019-10-27

## 2019-10-27 PROCEDURE — 6370000000 HC RX 637 (ALT 250 FOR IP): Performed by: STUDENT IN AN ORGANIZED HEALTH CARE EDUCATION/TRAINING PROGRAM

## 2019-10-27 PROCEDURE — 86403 PARTICLE AGGLUT ANTBDY SCRN: CPT

## 2019-10-27 PROCEDURE — 87660 TRICHOMONAS VAGIN DIR PROBE: CPT

## 2019-10-27 PROCEDURE — 87591 N.GONORRHOEAE DNA AMP PROB: CPT

## 2019-10-27 PROCEDURE — 87086 URINE CULTURE/COLONY COUNT: CPT

## 2019-10-27 PROCEDURE — 81001 URINALYSIS AUTO W/SCOPE: CPT

## 2019-10-27 PROCEDURE — 87491 CHLMYD TRACH DNA AMP PROBE: CPT

## 2019-10-27 PROCEDURE — 87480 CANDIDA DNA DIR PROBE: CPT

## 2019-10-27 PROCEDURE — 87510 GARDNER VAG DNA DIR PROBE: CPT

## 2019-10-27 RX ORDER — ACETAMINOPHEN 500 MG
1000 TABLET ORAL ONCE
Status: DISCONTINUED | OUTPATIENT
Start: 2019-10-27 | End: 2019-10-30 | Stop reason: HOSPADM

## 2019-10-27 RX ORDER — ONDANSETRON 4 MG/1
4 TABLET, ORALLY DISINTEGRATING ORAL ONCE
Status: COMPLETED | OUTPATIENT
Start: 2019-10-27 | End: 2019-10-27

## 2019-10-27 RX ADMIN — ONDANSETRON 4 MG: 4 TABLET, ORALLY DISINTEGRATING ORAL at 23:50

## 2019-10-28 PROBLEM — K58.2 IRRITABLE BOWEL SYNDROME WITH BOTH CONSTIPATION AND DIARRHEA: Status: ACTIVE | Noted: 2019-10-28

## 2019-10-28 LAB
-: ABNORMAL
ABO/RH: NORMAL
ABSOLUTE EOS #: 0 K/UL (ref 0–0.4)
ABSOLUTE IMMATURE GRANULOCYTE: ABNORMAL K/UL (ref 0–0.3)
ABSOLUTE LYMPH #: 1.72 K/UL (ref 1–4.8)
ABSOLUTE MONO #: 0.57 K/UL (ref 0.1–1.3)
AMORPHOUS: ABNORMAL
ANTIBODY SCREEN: NEGATIVE
ARM BAND NUMBER: NORMAL
BACTERIA: ABNORMAL
BASOPHILS # BLD: 0 % (ref 0–2)
BASOPHILS ABSOLUTE: 0 K/UL (ref 0–0.2)
BILIRUBIN URINE: NEGATIVE
BLOOD BANK COMMENT: NORMAL
CASTS UA: ABNORMAL /LPF
COLOR: ABNORMAL
COMMENT UA: ABNORMAL
CRYSTALS, UA: ABNORMAL /HPF
DIFFERENTIAL TYPE: ABNORMAL
DIRECT EXAM: ABNORMAL
EOSINOPHILS RELATIVE PERCENT: 0 % (ref 0–4)
EPITHELIAL CELLS UA: ABNORMAL /HPF
EXPIRATION DATE: NORMAL
GLUCOSE URINE: NEGATIVE
HCT VFR BLD CALC: 37.2 % (ref 36–46)
HEMOGLOBIN: 12.6 G/DL (ref 12–16)
IMMATURE GRANULOCYTES: ABNORMAL %
KETONES, URINE: ABNORMAL
LEUKOCYTE ESTERASE, URINE: ABNORMAL
LYMPHOCYTES # BLD: 9 % (ref 24–44)
Lab: ABNORMAL
MCH RBC QN AUTO: 28 PG (ref 26–34)
MCHC RBC AUTO-ENTMCNC: 33.9 G/DL (ref 31–37)
MCV RBC AUTO: 82.5 FL (ref 80–100)
MONOCYTES # BLD: 3 % (ref 1–7)
MORPHOLOGY: NORMAL
MUCUS: ABNORMAL
NITRITE, URINE: NEGATIVE
NRBC AUTOMATED: ABNORMAL PER 100 WBC
OTHER OBSERVATIONS UA: ABNORMAL
PDW BLD-RTO: 14 % (ref 11.5–14.9)
PH UA: 6 (ref 5–8)
PLATELET # BLD: 248 K/UL (ref 150–450)
PLATELET ESTIMATE: ABNORMAL
PMV BLD AUTO: 8.9 FL (ref 6–12)
PROTEIN UA: ABNORMAL
RBC # BLD: 4.51 M/UL (ref 4–5.2)
RBC # BLD: ABNORMAL 10*6/UL
RBC UA: ABNORMAL /HPF
RENAL EPITHELIAL, UA: ABNORMAL /HPF
SEG NEUTROPHILS: 88 % (ref 36–66)
SEGMENTED NEUTROPHILS ABSOLUTE COUNT: 16.81 K/UL (ref 1.3–9.1)
SPECIFIC GRAVITY UA: 1.04 (ref 1–1.03)
SPECIMEN DESCRIPTION: ABNORMAL
T. PALLIDUM, IGG: NONREACTIVE
TRICHOMONAS: ABNORMAL
TURBIDITY: ABNORMAL
URINE HGB: ABNORMAL
UROBILINOGEN, URINE: NORMAL
WBC # BLD: 19.1 K/UL (ref 3.5–11)
WBC # BLD: ABNORMAL 10*3/UL
WBC UA: ABNORMAL /HPF
YEAST: ABNORMAL

## 2019-10-28 PROCEDURE — 96361 HYDRATE IV INFUSION ADD-ON: CPT

## 2019-10-28 PROCEDURE — 96374 THER/PROPH/DIAG INJ IV PUSH: CPT

## 2019-10-28 PROCEDURE — 85025 COMPLETE CBC W/AUTO DIFF WBC: CPT

## 2019-10-28 PROCEDURE — 87660 TRICHOMONAS VAGIN DIR PROBE: CPT

## 2019-10-28 PROCEDURE — 2580000003 HC RX 258: Performed by: OBSTETRICS & GYNECOLOGY

## 2019-10-28 PROCEDURE — 6360000002 HC RX W HCPCS: Performed by: STUDENT IN AN ORGANIZED HEALTH CARE EDUCATION/TRAINING PROGRAM

## 2019-10-28 PROCEDURE — 86900 BLOOD TYPING SEROLOGIC ABO: CPT

## 2019-10-28 PROCEDURE — 6360000002 HC RX W HCPCS: Performed by: OBSTETRICS & GYNECOLOGY

## 2019-10-28 PROCEDURE — 1220000000 HC SEMI PRIVATE OB R&B

## 2019-10-28 PROCEDURE — 96360 HYDRATION IV INFUSION INIT: CPT

## 2019-10-28 PROCEDURE — 96372 THER/PROPH/DIAG INJ SC/IM: CPT

## 2019-10-28 PROCEDURE — 86850 RBC ANTIBODY SCREEN: CPT

## 2019-10-28 PROCEDURE — 96365 THER/PROPH/DIAG IV INF INIT: CPT

## 2019-10-28 PROCEDURE — 86780 TREPONEMA PALLIDUM: CPT

## 2019-10-28 PROCEDURE — 86901 BLOOD TYPING SEROLOGIC RH(D): CPT

## 2019-10-28 PROCEDURE — 96375 TX/PRO/DX INJ NEW DRUG ADDON: CPT

## 2019-10-28 PROCEDURE — 36415 COLL VENOUS BLD VENIPUNCTURE: CPT

## 2019-10-28 PROCEDURE — G0378 HOSPITAL OBSERVATION PER HR: HCPCS

## 2019-10-28 PROCEDURE — 87510 GARDNER VAG DNA DIR PROBE: CPT

## 2019-10-28 PROCEDURE — 96376 TX/PRO/DX INJ SAME DRUG ADON: CPT

## 2019-10-28 PROCEDURE — 2580000003 HC RX 258: Performed by: STUDENT IN AN ORGANIZED HEALTH CARE EDUCATION/TRAINING PROGRAM

## 2019-10-28 PROCEDURE — 99213 OFFICE O/P EST LOW 20 MIN: CPT

## 2019-10-28 PROCEDURE — 6370000000 HC RX 637 (ALT 250 FOR IP): Performed by: STUDENT IN AN ORGANIZED HEALTH CARE EDUCATION/TRAINING PROGRAM

## 2019-10-28 PROCEDURE — 87480 CANDIDA DNA DIR PROBE: CPT

## 2019-10-28 RX ORDER — SODIUM CHLORIDE, SODIUM LACTATE, POTASSIUM CHLORIDE, CALCIUM CHLORIDE 600; 310; 30; 20 MG/100ML; MG/100ML; MG/100ML; MG/100ML
INJECTION, SOLUTION INTRAVENOUS CONTINUOUS
Status: DISCONTINUED | OUTPATIENT
Start: 2019-10-28 | End: 2019-10-30 | Stop reason: HOSPADM

## 2019-10-28 RX ORDER — OXYCODONE HYDROCHLORIDE AND ACETAMINOPHEN 5; 325 MG/1; MG/1
1 TABLET ORAL EVERY 4 HOURS PRN
Status: DISCONTINUED | OUTPATIENT
Start: 2019-10-28 | End: 2019-10-30 | Stop reason: HOSPADM

## 2019-10-28 RX ORDER — DOCUSATE SODIUM 100 MG/1
100 CAPSULE, LIQUID FILLED ORAL 2 TIMES DAILY PRN
Status: DISCONTINUED | OUTPATIENT
Start: 2019-10-28 | End: 2019-10-30 | Stop reason: HOSPADM

## 2019-10-28 RX ORDER — SODIUM PHOSPHATE, DIBASIC AND SODIUM PHOSPHATE, MONOBASIC 7; 19 G/133ML; G/133ML
1 ENEMA RECTAL
Status: COMPLETED | OUTPATIENT
Start: 2019-10-28 | End: 2019-10-28

## 2019-10-28 RX ORDER — MORPHINE SULFATE 4 MG/ML
4 INJECTION, SOLUTION INTRAMUSCULAR; INTRAVENOUS
Status: DISCONTINUED | OUTPATIENT
Start: 2019-10-28 | End: 2019-10-30 | Stop reason: HOSPADM

## 2019-10-28 RX ORDER — PROMETHAZINE HYDROCHLORIDE 25 MG/ML
12.5 INJECTION, SOLUTION INTRAMUSCULAR; INTRAVENOUS ONCE
Status: COMPLETED | OUTPATIENT
Start: 2019-10-28 | End: 2019-10-28

## 2019-10-28 RX ORDER — SIMETHICONE 80 MG
80 TABLET,CHEWABLE ORAL EVERY 6 HOURS PRN
Status: DISCONTINUED | OUTPATIENT
Start: 2019-10-28 | End: 2019-10-30 | Stop reason: HOSPADM

## 2019-10-28 RX ORDER — SODIUM CHLORIDE, SODIUM LACTATE, POTASSIUM CHLORIDE, AND CALCIUM CHLORIDE .6; .31; .03; .02 G/100ML; G/100ML; G/100ML; G/100ML
1000 INJECTION, SOLUTION INTRAVENOUS ONCE
Status: COMPLETED | OUTPATIENT
Start: 2019-10-28 | End: 2019-10-28

## 2019-10-28 RX ORDER — MORPHINE SULFATE 2 MG/ML
2 INJECTION, SOLUTION INTRAMUSCULAR; INTRAVENOUS
Status: DISCONTINUED | OUTPATIENT
Start: 2019-10-28 | End: 2019-10-30 | Stop reason: HOSPADM

## 2019-10-28 RX ADMIN — SODIUM CHLORIDE, POTASSIUM CHLORIDE, SODIUM LACTATE AND CALCIUM CHLORIDE: 600; 310; 30; 20 INJECTION, SOLUTION INTRAVENOUS at 20:45

## 2019-10-28 RX ADMIN — DOCUSATE SODIUM 100 MG: 100 CAPSULE, LIQUID FILLED ORAL at 21:51

## 2019-10-28 RX ADMIN — SODIUM PHOSPHATE 1 ENEMA: 7; 19 ENEMA RECTAL at 00:27

## 2019-10-28 RX ADMIN — PROMETHAZINE HYDROCHLORIDE 12.5 MG: 25 INJECTION INTRAMUSCULAR; INTRAVENOUS at 01:45

## 2019-10-28 RX ADMIN — MORPHINE SULFATE 4 MG: 4 INJECTION INTRAVENOUS at 04:36

## 2019-10-28 RX ADMIN — SODIUM CHLORIDE, POTASSIUM CHLORIDE, SODIUM LACTATE AND CALCIUM CHLORIDE: 600; 310; 30; 20 INJECTION, SOLUTION INTRAVENOUS at 02:56

## 2019-10-28 RX ADMIN — MORPHINE SULFATE 4 MG: 4 INJECTION INTRAVENOUS at 01:47

## 2019-10-28 RX ADMIN — SODIUM CHLORIDE, POTASSIUM CHLORIDE, SODIUM LACTATE AND CALCIUM CHLORIDE 1000 ML: 600; 310; 30; 20 INJECTION, SOLUTION INTRAVENOUS at 01:01

## 2019-10-28 RX ADMIN — CEFTRIAXONE SODIUM 1 G: 500 INJECTION, POWDER, FOR SOLUTION INTRAMUSCULAR; INTRAVENOUS at 08:47

## 2019-10-28 RX ADMIN — MORPHINE SULFATE 4 MG: 4 INJECTION INTRAVENOUS at 07:18

## 2019-10-28 ASSESSMENT — PAIN SCALES - GENERAL
PAINLEVEL_OUTOF10: 10
PAINLEVEL_OUTOF10: 7

## 2019-10-28 ASSESSMENT — PAIN DESCRIPTION - DESCRIPTORS: DESCRIPTORS: ACHING;DISCOMFORT

## 2019-10-28 ASSESSMENT — PAIN DESCRIPTION - ORIENTATION: ORIENTATION: LEFT

## 2019-10-28 ASSESSMENT — PAIN DESCRIPTION - PAIN TYPE: TYPE: ACUTE PAIN

## 2019-10-28 ASSESSMENT — PAIN DESCRIPTION - LOCATION: LOCATION: BACK

## 2019-10-29 PROBLEM — O23.00 PYELONEPHRITIS AFFECTING PREGNANCY: Status: ACTIVE | Noted: 2019-10-29

## 2019-10-29 LAB
ABSOLUTE EOS #: 0 K/UL (ref 0–0.4)
ABSOLUTE IMMATURE GRANULOCYTE: ABNORMAL K/UL (ref 0–0.3)
ABSOLUTE LYMPH #: 2.1 K/UL (ref 1–4.8)
ABSOLUTE MONO #: 0.7 K/UL (ref 0.1–1.3)
BASOPHILS # BLD: 0 % (ref 0–2)
BASOPHILS ABSOLUTE: 0 K/UL (ref 0–0.2)
C TRACH DNA GENITAL QL NAA+PROBE: NEGATIVE
DIFFERENTIAL TYPE: ABNORMAL
EOSINOPHILS RELATIVE PERCENT: 0 % (ref 0–4)
HCT VFR BLD CALC: 33 % (ref 36–46)
HEMOGLOBIN: 10.9 G/DL (ref 12–16)
IMMATURE GRANULOCYTES: ABNORMAL %
LYMPHOCYTES # BLD: 15 % (ref 24–44)
MCH RBC QN AUTO: 27.8 PG (ref 26–34)
MCHC RBC AUTO-ENTMCNC: 33.2 G/DL (ref 31–37)
MCV RBC AUTO: 83.6 FL (ref 80–100)
MONOCYTES # BLD: 5 % (ref 1–7)
N. GONORRHOEAE DNA: NEGATIVE
NRBC AUTOMATED: ABNORMAL PER 100 WBC
PDW BLD-RTO: 14.3 % (ref 11.5–14.9)
PLATELET # BLD: 235 K/UL (ref 150–450)
PLATELET ESTIMATE: ABNORMAL
PMV BLD AUTO: 8.4 FL (ref 6–12)
RBC # BLD: 3.94 M/UL (ref 4–5.2)
RBC # BLD: ABNORMAL 10*6/UL
SEG NEUTROPHILS: 80 % (ref 36–66)
SEGMENTED NEUTROPHILS ABSOLUTE COUNT: 11.1 K/UL (ref 1.3–9.1)
SPECIMEN DESCRIPTION: NORMAL
WBC # BLD: 14 K/UL (ref 3.5–11)
WBC # BLD: ABNORMAL 10*3/UL

## 2019-10-29 PROCEDURE — 6360000002 HC RX W HCPCS: Performed by: STUDENT IN AN ORGANIZED HEALTH CARE EDUCATION/TRAINING PROGRAM

## 2019-10-29 PROCEDURE — 36415 COLL VENOUS BLD VENIPUNCTURE: CPT

## 2019-10-29 PROCEDURE — 96366 THER/PROPH/DIAG IV INF ADDON: CPT

## 2019-10-29 PROCEDURE — 96375 TX/PRO/DX INJ NEW DRUG ADDON: CPT

## 2019-10-29 PROCEDURE — 1220000000 HC SEMI PRIVATE OB R&B

## 2019-10-29 PROCEDURE — 2580000003 HC RX 258: Performed by: STUDENT IN AN ORGANIZED HEALTH CARE EDUCATION/TRAINING PROGRAM

## 2019-10-29 PROCEDURE — 2580000003 HC RX 258: Performed by: OBSTETRICS & GYNECOLOGY

## 2019-10-29 PROCEDURE — 6360000002 HC RX W HCPCS: Performed by: OBSTETRICS & GYNECOLOGY

## 2019-10-29 PROCEDURE — G0378 HOSPITAL OBSERVATION PER HR: HCPCS

## 2019-10-29 PROCEDURE — 6370000000 HC RX 637 (ALT 250 FOR IP): Performed by: STUDENT IN AN ORGANIZED HEALTH CARE EDUCATION/TRAINING PROGRAM

## 2019-10-29 PROCEDURE — 85025 COMPLETE CBC W/AUTO DIFF WBC: CPT

## 2019-10-29 RX ORDER — FLUTICASONE PROPIONATE 110 UG/1
1 AEROSOL, METERED RESPIRATORY (INHALATION) 2 TIMES DAILY PRN
Status: DISCONTINUED | OUTPATIENT
Start: 2019-10-29 | End: 2019-10-30 | Stop reason: HOSPADM

## 2019-10-29 RX ORDER — METRONIDAZOLE 500 MG/1
500 TABLET ORAL 2 TIMES DAILY
Qty: 14 TABLET | Refills: 0 | Status: SHIPPED | OUTPATIENT
Start: 2019-10-29 | End: 2019-11-05

## 2019-10-29 RX ORDER — ALBUTEROL SULFATE 90 UG/1
2 AEROSOL, METERED RESPIRATORY (INHALATION) EVERY 6 HOURS PRN
Status: DISCONTINUED | OUTPATIENT
Start: 2019-10-29 | End: 2019-10-30 | Stop reason: HOSPADM

## 2019-10-29 RX ORDER — ONDANSETRON 2 MG/ML
4 INJECTION INTRAMUSCULAR; INTRAVENOUS EVERY 6 HOURS PRN
Status: DISCONTINUED | OUTPATIENT
Start: 2019-10-29 | End: 2019-10-30 | Stop reason: HOSPADM

## 2019-10-29 RX ORDER — ASPIRIN 81 MG/1
81 TABLET ORAL DAILY
Qty: 90 TABLET | Refills: 1 | Status: ON HOLD | OUTPATIENT
Start: 2019-10-29 | End: 2020-02-19 | Stop reason: HOSPADM

## 2019-10-29 RX ADMIN — OXYCODONE HYDROCHLORIDE AND ACETAMINOPHEN 1 TABLET: 5; 325 TABLET ORAL at 12:19

## 2019-10-29 RX ADMIN — CEFTRIAXONE SODIUM 1 G: 500 INJECTION, POWDER, FOR SOLUTION INTRAMUSCULAR; INTRAVENOUS at 06:25

## 2019-10-29 RX ADMIN — SODIUM CHLORIDE, POTASSIUM CHLORIDE, SODIUM LACTATE AND CALCIUM CHLORIDE: 600; 310; 30; 20 INJECTION, SOLUTION INTRAVENOUS at 05:20

## 2019-10-29 RX ADMIN — ONDANSETRON 4 MG: 2 INJECTION INTRAMUSCULAR; INTRAVENOUS at 06:25

## 2019-10-29 RX ADMIN — MORPHINE SULFATE 2 MG: 2 INJECTION, SOLUTION INTRAMUSCULAR; INTRAVENOUS at 05:20

## 2019-10-29 ASSESSMENT — PAIN SCALES - GENERAL
PAINLEVEL_OUTOF10: 4
PAINLEVEL_OUTOF10: 10
PAINLEVEL_OUTOF10: 6

## 2019-10-30 VITALS
WEIGHT: 174 LBS | DIASTOLIC BLOOD PRESSURE: 56 MMHG | SYSTOLIC BLOOD PRESSURE: 99 MMHG | RESPIRATION RATE: 16 BRPM | HEART RATE: 88 BPM | HEIGHT: 59 IN | TEMPERATURE: 98.2 F | BODY MASS INDEX: 35.08 KG/M2

## 2019-10-30 LAB
ABSOLUTE EOS #: 0.1 K/UL (ref 0–0.4)
ABSOLUTE IMMATURE GRANULOCYTE: ABNORMAL K/UL (ref 0–0.3)
ABSOLUTE LYMPH #: 3.2 K/UL (ref 1–4.8)
ABSOLUTE MONO #: 0.8 K/UL (ref 0.1–1.3)
BASOPHILS # BLD: 0 % (ref 0–2)
BASOPHILS ABSOLUTE: 0 K/UL (ref 0–0.2)
CULTURE: ABNORMAL
CULTURE: ABNORMAL
DIFFERENTIAL TYPE: ABNORMAL
EOSINOPHILS RELATIVE PERCENT: 1 % (ref 0–4)
HCT VFR BLD CALC: 32.1 % (ref 36–46)
HEMOGLOBIN: 10.7 G/DL (ref 12–16)
IMMATURE GRANULOCYTES: ABNORMAL %
LYMPHOCYTES # BLD: 26 % (ref 24–44)
Lab: ABNORMAL
MCH RBC QN AUTO: 27.9 PG (ref 26–34)
MCHC RBC AUTO-ENTMCNC: 33.3 G/DL (ref 31–37)
MCV RBC AUTO: 83.7 FL (ref 80–100)
MONOCYTES # BLD: 7 % (ref 1–7)
NRBC AUTOMATED: ABNORMAL PER 100 WBC
PDW BLD-RTO: 14.2 % (ref 11.5–14.9)
PLATELET # BLD: 221 K/UL (ref 150–450)
PLATELET ESTIMATE: ABNORMAL
PMV BLD AUTO: 8.7 FL (ref 6–12)
RBC # BLD: 3.84 M/UL (ref 4–5.2)
RBC # BLD: ABNORMAL 10*6/UL
SEG NEUTROPHILS: 66 % (ref 36–66)
SEGMENTED NEUTROPHILS ABSOLUTE COUNT: 8.5 K/UL (ref 1.3–9.1)
SPECIMEN DESCRIPTION: ABNORMAL
WBC # BLD: 12.7 K/UL (ref 3.5–11)
WBC # BLD: ABNORMAL 10*3/UL

## 2019-10-30 PROCEDURE — 6360000002 HC RX W HCPCS: Performed by: OBSTETRICS & GYNECOLOGY

## 2019-10-30 PROCEDURE — 36415 COLL VENOUS BLD VENIPUNCTURE: CPT

## 2019-10-30 PROCEDURE — G0378 HOSPITAL OBSERVATION PER HR: HCPCS

## 2019-10-30 PROCEDURE — 2580000003 HC RX 258: Performed by: OBSTETRICS & GYNECOLOGY

## 2019-10-30 PROCEDURE — 6370000000 HC RX 637 (ALT 250 FOR IP): Performed by: STUDENT IN AN ORGANIZED HEALTH CARE EDUCATION/TRAINING PROGRAM

## 2019-10-30 PROCEDURE — 85025 COMPLETE CBC W/AUTO DIFF WBC: CPT

## 2019-10-30 PROCEDURE — 96366 THER/PROPH/DIAG IV INF ADDON: CPT

## 2019-10-30 RX ORDER — BISACODYL 10 MG
10 SUPPOSITORY, RECTAL RECTAL DAILY PRN
Status: DISCONTINUED | OUTPATIENT
Start: 2019-10-30 | End: 2019-10-30 | Stop reason: HOSPADM

## 2019-10-30 RX ORDER — AMOXICILLIN 500 MG/1
500 CAPSULE ORAL 2 TIMES DAILY
Qty: 14 CAPSULE | Refills: 0 | Status: SHIPPED | OUTPATIENT
Start: 2019-10-30 | End: 2019-11-06

## 2019-10-30 RX ADMIN — MAGNESIUM HYDROXIDE 30 ML: 400 SUSPENSION ORAL at 11:32

## 2019-10-30 RX ADMIN — CEFTRIAXONE SODIUM 1 G: 500 INJECTION, POWDER, FOR SOLUTION INTRAMUSCULAR; INTRAVENOUS at 06:27

## 2019-10-31 ENCOUNTER — APPOINTMENT (OUTPATIENT)
Dept: ULTRASOUND IMAGING | Age: 24
End: 2019-10-31
Payer: COMMERCIAL

## 2019-10-31 ENCOUNTER — HOSPITAL ENCOUNTER (OUTPATIENT)
Age: 24
Setting detail: OBSERVATION
Discharge: HOME | End: 2019-11-01
Attending: OBSTETRICS & GYNECOLOGY | Admitting: SPECIALIST
Payer: COMMERCIAL

## 2019-10-31 LAB
-: ABNORMAL
ABSOLUTE EOS #: 0 K/UL (ref 0–0.4)
ABSOLUTE IMMATURE GRANULOCYTE: ABNORMAL K/UL (ref 0–0.3)
ABSOLUTE LYMPH #: 0.93 K/UL (ref 1–4.8)
ABSOLUTE MONO #: 1.3 K/UL (ref 0.1–1.3)
ALBUMIN SERPL-MCNC: 3.5 G/DL (ref 3.5–5.2)
ALBUMIN/GLOBULIN RATIO: ABNORMAL (ref 1–2.5)
ALP BLD-CCNC: 83 U/L (ref 35–104)
ALT SERPL-CCNC: 13 U/L (ref 5–33)
AMORPHOUS: ABNORMAL
ANION GAP SERPL CALCULATED.3IONS-SCNC: 15 MMOL/L (ref 9–17)
AST SERPL-CCNC: 17 U/L
BACTERIA: ABNORMAL
BASOPHILS # BLD: 0 % (ref 0–2)
BASOPHILS ABSOLUTE: 0 K/UL (ref 0–0.2)
BILIRUB SERPL-MCNC: 0.19 MG/DL (ref 0.3–1.2)
BILIRUBIN URINE: NEGATIVE
BUN BLDV-MCNC: 6 MG/DL (ref 6–20)
BUN/CREAT BLD: ABNORMAL (ref 9–20)
CALCIUM SERPL-MCNC: 9.3 MG/DL (ref 8.6–10.4)
CASTS UA: ABNORMAL /LPF
CHLORIDE BLD-SCNC: 102 MMOL/L (ref 98–107)
CO2: 22 MMOL/L (ref 20–31)
COLOR: YELLOW
COMMENT UA: ABNORMAL
CREAT SERPL-MCNC: 0.62 MG/DL (ref 0.5–0.9)
CRYSTALS, UA: ABNORMAL /HPF
DIFFERENTIAL TYPE: ABNORMAL
EOSINOPHILS RELATIVE PERCENT: 0 % (ref 0–4)
EPITHELIAL CELLS UA: ABNORMAL /HPF
GFR AFRICAN AMERICAN: >60 ML/MIN
GFR NON-AFRICAN AMERICAN: >60 ML/MIN
GFR SERPL CREATININE-BSD FRML MDRD: ABNORMAL ML/MIN/{1.73_M2}
GFR SERPL CREATININE-BSD FRML MDRD: ABNORMAL ML/MIN/{1.73_M2}
GLUCOSE BLD-MCNC: 102 MG/DL (ref 70–99)
GLUCOSE URINE: NEGATIVE
HCT VFR BLD CALC: 37.4 % (ref 36–46)
HEMOGLOBIN: 12.4 G/DL (ref 12–16)
IMMATURE GRANULOCYTES: ABNORMAL %
KETONES, URINE: ABNORMAL
LEUKOCYTE ESTERASE, URINE: NEGATIVE
LYMPHOCYTES # BLD: 5 % (ref 24–44)
MCH RBC QN AUTO: 27.6 PG (ref 26–34)
MCHC RBC AUTO-ENTMCNC: 33 G/DL (ref 31–37)
MCV RBC AUTO: 83.5 FL (ref 80–100)
MONOCYTES # BLD: 7 % (ref 1–7)
MORPHOLOGY: NORMAL
MUCUS: ABNORMAL
NITRITE, URINE: NEGATIVE
NRBC AUTOMATED: ABNORMAL PER 100 WBC
OTHER OBSERVATIONS UA: ABNORMAL
PDW BLD-RTO: 14.2 % (ref 11.5–14.9)
PH UA: 7.5 (ref 5–8)
PLATELET # BLD: 269 K/UL (ref 150–450)
PLATELET ESTIMATE: ABNORMAL
PMV BLD AUTO: 8.7 FL (ref 6–12)
POTASSIUM SERPL-SCNC: 3.8 MMOL/L (ref 3.7–5.3)
PROTEIN UA: NEGATIVE
RBC # BLD: 4.48 M/UL (ref 4–5.2)
RBC # BLD: ABNORMAL 10*6/UL
RBC UA: ABNORMAL /HPF
RENAL EPITHELIAL, UA: ABNORMAL /HPF
SEG NEUTROPHILS: 88 % (ref 36–66)
SEGMENTED NEUTROPHILS ABSOLUTE COUNT: 16.27 K/UL (ref 1.3–9.1)
SODIUM BLD-SCNC: 139 MMOL/L (ref 135–144)
SPECIFIC GRAVITY UA: 1.01 (ref 1–1.03)
TOTAL PROTEIN: 6.8 G/DL (ref 6.4–8.3)
TRICHOMONAS: ABNORMAL
TURBIDITY: CLEAR
URINE HGB: NEGATIVE
UROBILINOGEN, URINE: NORMAL
WBC # BLD: 18.5 K/UL (ref 3.5–11)
WBC # BLD: ABNORMAL 10*3/UL
WBC UA: ABNORMAL /HPF
YEAST: ABNORMAL

## 2019-10-31 PROCEDURE — 6370000000 HC RX 637 (ALT 250 FOR IP): Performed by: STUDENT IN AN ORGANIZED HEALTH CARE EDUCATION/TRAINING PROGRAM

## 2019-10-31 PROCEDURE — G0378 HOSPITAL OBSERVATION PER HR: HCPCS

## 2019-10-31 PROCEDURE — 1220000000 HC SEMI PRIVATE OB R&B

## 2019-10-31 PROCEDURE — 2580000003 HC RX 258: Performed by: STUDENT IN AN ORGANIZED HEALTH CARE EDUCATION/TRAINING PROGRAM

## 2019-10-31 PROCEDURE — 85025 COMPLETE CBC W/AUTO DIFF WBC: CPT

## 2019-10-31 PROCEDURE — 87086 URINE CULTURE/COLONY COUNT: CPT

## 2019-10-31 PROCEDURE — 36415 COLL VENOUS BLD VENIPUNCTURE: CPT

## 2019-10-31 PROCEDURE — 96361 HYDRATE IV INFUSION ADD-ON: CPT

## 2019-10-31 PROCEDURE — 81001 URINALYSIS AUTO W/SCOPE: CPT

## 2019-10-31 PROCEDURE — G0379 DIRECT REFER HOSPITAL OBSERV: HCPCS

## 2019-10-31 PROCEDURE — 76770 US EXAM ABDO BACK WALL COMP: CPT

## 2019-10-31 PROCEDURE — 80053 COMPREHEN METABOLIC PANEL: CPT

## 2019-10-31 RX ORDER — MAGNESIUM HYDROXIDE/ALUMINUM HYDROXICE/SIMETHICONE 120; 1200; 1200 MG/30ML; MG/30ML; MG/30ML
30 SUSPENSION ORAL EVERY 6 HOURS PRN
Status: DISCONTINUED | OUTPATIENT
Start: 2019-10-31 | End: 2019-11-01 | Stop reason: HOSPADM

## 2019-10-31 RX ORDER — SODIUM CHLORIDE, SODIUM LACTATE, POTASSIUM CHLORIDE, AND CALCIUM CHLORIDE .6; .31; .03; .02 G/100ML; G/100ML; G/100ML; G/100ML
500 INJECTION, SOLUTION INTRAVENOUS ONCE
Status: COMPLETED | OUTPATIENT
Start: 2019-10-31 | End: 2019-10-31

## 2019-10-31 RX ORDER — BISACODYL 10 MG
10 SUPPOSITORY, RECTAL RECTAL DAILY PRN
Status: DISCONTINUED | OUTPATIENT
Start: 2019-10-31 | End: 2019-11-01 | Stop reason: HOSPADM

## 2019-10-31 RX ORDER — SODIUM CHLORIDE, SODIUM LACTATE, POTASSIUM CHLORIDE, CALCIUM CHLORIDE 600; 310; 30; 20 MG/100ML; MG/100ML; MG/100ML; MG/100ML
INJECTION, SOLUTION INTRAVENOUS CONTINUOUS
Status: DISCONTINUED | OUTPATIENT
Start: 2019-10-31 | End: 2019-11-01 | Stop reason: HOSPADM

## 2019-10-31 RX ORDER — AMOXICILLIN 500 MG/1
500 CAPSULE ORAL EVERY 12 HOURS
Status: DISCONTINUED | OUTPATIENT
Start: 2019-10-31 | End: 2019-11-01 | Stop reason: HOSPADM

## 2019-10-31 RX ORDER — SODIUM CHLORIDE 0.9 % (FLUSH) 0.9 %
10 SYRINGE (ML) INJECTION PRN
Status: DISCONTINUED | OUTPATIENT
Start: 2019-10-31 | End: 2019-11-01 | Stop reason: HOSPADM

## 2019-10-31 RX ORDER — SODIUM CHLORIDE 0.9 % (FLUSH) 0.9 %
10 SYRINGE (ML) INJECTION EVERY 12 HOURS SCHEDULED
Status: DISCONTINUED | OUTPATIENT
Start: 2019-10-31 | End: 2019-11-01 | Stop reason: HOSPADM

## 2019-10-31 RX ORDER — SODIUM PHOSPHATE, DIBASIC AND SODIUM PHOSPHATE, MONOBASIC 7; 19 G/133ML; G/133ML
1 ENEMA RECTAL
Status: COMPLETED | OUTPATIENT
Start: 2019-10-31 | End: 2019-10-31

## 2019-10-31 RX ORDER — ONDANSETRON 2 MG/ML
4 INJECTION INTRAMUSCULAR; INTRAVENOUS EVERY 6 HOURS PRN
Status: DISCONTINUED | OUTPATIENT
Start: 2019-10-31 | End: 2019-11-01 | Stop reason: HOSPADM

## 2019-10-31 RX ORDER — ACETAMINOPHEN 500 MG
1000 TABLET ORAL EVERY 6 HOURS PRN
Status: DISCONTINUED | OUTPATIENT
Start: 2019-10-31 | End: 2019-11-01 | Stop reason: HOSPADM

## 2019-10-31 RX ADMIN — SODIUM CHLORIDE, POTASSIUM CHLORIDE, SODIUM LACTATE AND CALCIUM CHLORIDE 500 ML: 600; 310; 30; 20 INJECTION, SOLUTION INTRAVENOUS at 13:41

## 2019-10-31 RX ADMIN — ALUMINUM HYDROXIDE, MAGNESIUM HYDROXIDE, AND SIMETHICONE 30 ML: 200; 200; 20 SUSPENSION ORAL at 13:36

## 2019-10-31 RX ADMIN — BISACODYL 10 MG: 10 SUPPOSITORY RECTAL at 15:16

## 2019-10-31 RX ADMIN — SODIUM PHOSPHATE 1 ENEMA: 7; 19 ENEMA RECTAL at 13:36

## 2019-10-31 RX ADMIN — ACETAMINOPHEN 1000 MG: 500 TABLET, FILM COATED ORAL at 13:35

## 2019-10-31 ASSESSMENT — PAIN SCALES - GENERAL
PAINLEVEL_OUTOF10: 10
PAINLEVEL_OUTOF10: 6
PAINLEVEL_OUTOF10: 10

## 2019-10-31 ASSESSMENT — PAIN DESCRIPTION - DESCRIPTORS: DESCRIPTORS: CRAMPING;PRESSURE;BURNING

## 2019-11-01 VITALS
DIASTOLIC BLOOD PRESSURE: 51 MMHG | TEMPERATURE: 99 F | HEART RATE: 102 BPM | SYSTOLIC BLOOD PRESSURE: 89 MMHG | RESPIRATION RATE: 16 BRPM

## 2019-11-01 PROBLEM — K59.00 CONSTIPATION: Status: ACTIVE | Noted: 2019-11-01

## 2019-11-01 LAB
ABSOLUTE EOS #: 0 K/UL (ref 0–0.4)
ABSOLUTE IMMATURE GRANULOCYTE: ABNORMAL K/UL (ref 0–0.3)
ABSOLUTE LYMPH #: 1.56 K/UL (ref 1–4.8)
ABSOLUTE MONO #: 0.94 K/UL (ref 0.1–1.3)
BASOPHILS # BLD: 0 % (ref 0–2)
BASOPHILS ABSOLUTE: 0 K/UL (ref 0–0.2)
DIFFERENTIAL TYPE: ABNORMAL
EOSINOPHILS RELATIVE PERCENT: 0 % (ref 0–4)
HCT VFR BLD CALC: 31.2 % (ref 36–46)
HEMOGLOBIN: 10.5 G/DL (ref 12–16)
IMMATURE GRANULOCYTES: ABNORMAL %
LYMPHOCYTES # BLD: 10 % (ref 24–44)
MCH RBC QN AUTO: 28 PG (ref 26–34)
MCHC RBC AUTO-ENTMCNC: 33.8 G/DL (ref 31–37)
MCV RBC AUTO: 83 FL (ref 80–100)
MONOCYTES # BLD: 6 % (ref 1–7)
MORPHOLOGY: NORMAL
NRBC AUTOMATED: ABNORMAL PER 100 WBC
PDW BLD-RTO: 14.4 % (ref 11.5–14.9)
PLATELET # BLD: 223 K/UL (ref 150–450)
PLATELET ESTIMATE: ABNORMAL
PMV BLD AUTO: 8.5 FL (ref 6–12)
RBC # BLD: 3.76 M/UL (ref 4–5.2)
RBC # BLD: ABNORMAL 10*6/UL
SEG NEUTROPHILS: 84 % (ref 36–66)
SEGMENTED NEUTROPHILS ABSOLUTE COUNT: 13.1 K/UL (ref 1.3–9.1)
WBC # BLD: 15.6 K/UL (ref 3.5–11)
WBC # BLD: ABNORMAL 10*3/UL

## 2019-11-01 PROCEDURE — 96374 THER/PROPH/DIAG INJ IV PUSH: CPT

## 2019-11-01 PROCEDURE — 85025 COMPLETE CBC W/AUTO DIFF WBC: CPT

## 2019-11-01 PROCEDURE — 99213 OFFICE O/P EST LOW 20 MIN: CPT

## 2019-11-01 PROCEDURE — 6360000002 HC RX W HCPCS: Performed by: STUDENT IN AN ORGANIZED HEALTH CARE EDUCATION/TRAINING PROGRAM

## 2019-11-01 PROCEDURE — 36415 COLL VENOUS BLD VENIPUNCTURE: CPT

## 2019-11-01 PROCEDURE — G0378 HOSPITAL OBSERVATION PER HR: HCPCS

## 2019-11-01 PROCEDURE — 6370000000 HC RX 637 (ALT 250 FOR IP): Performed by: STUDENT IN AN ORGANIZED HEALTH CARE EDUCATION/TRAINING PROGRAM

## 2019-11-01 RX ADMIN — MAGNESIUM HYDROXIDE 30 ML: 400 SUSPENSION ORAL at 00:00

## 2019-11-01 RX ADMIN — AMOXICILLIN 500 MG: 500 CAPSULE ORAL at 00:08

## 2019-11-01 RX ADMIN — ONDANSETRON 4 MG: 2 INJECTION INTRAMUSCULAR; INTRAVENOUS at 10:16

## 2019-11-01 RX ADMIN — POLYETHYLENE GLYCOL-3350 AND ELECTROLYTES 4000 ML: 236; 6.74; 5.86; 2.97; 22.74 POWDER, FOR SOLUTION ORAL at 09:11

## 2019-11-02 LAB
CULTURE: NORMAL
Lab: NORMAL
SPECIMEN DESCRIPTION: NORMAL

## 2019-11-04 ENCOUNTER — TELEPHONE (OUTPATIENT)
Dept: FAMILY MEDICINE CLINIC | Age: 24
End: 2019-11-04

## 2019-11-11 ENCOUNTER — OFFICE VISIT (OUTPATIENT)
Dept: FAMILY MEDICINE CLINIC | Age: 24
End: 2019-11-11
Payer: COMMERCIAL

## 2019-11-11 VITALS
OXYGEN SATURATION: 99 % | HEART RATE: 94 BPM | SYSTOLIC BLOOD PRESSURE: 98 MMHG | BODY MASS INDEX: 34.47 KG/M2 | HEIGHT: 59 IN | WEIGHT: 171 LBS | DIASTOLIC BLOOD PRESSURE: 72 MMHG

## 2019-11-11 DIAGNOSIS — M54.50 ACUTE MIDLINE LOW BACK PAIN WITHOUT SCIATICA: ICD-10-CM

## 2019-11-11 DIAGNOSIS — Z3A.25 25 WEEKS GESTATION OF PREGNANCY: ICD-10-CM

## 2019-11-11 DIAGNOSIS — K59.04 CHRONIC IDIOPATHIC CONSTIPATION: Primary | ICD-10-CM

## 2019-11-11 PROBLEM — O23.00 PYELONEPHRITIS AFFECTING PREGNANCY: Status: RESOLVED | Noted: 2019-10-29 | Resolved: 2019-11-11

## 2019-11-11 PROCEDURE — 99213 OFFICE O/P EST LOW 20 MIN: CPT | Performed by: FAMILY MEDICINE

## 2019-11-11 PROCEDURE — G8417 CALC BMI ABV UP PARAM F/U: HCPCS | Performed by: FAMILY MEDICINE

## 2019-11-11 PROCEDURE — 1111F DSCHRG MED/CURRENT MED MERGE: CPT | Performed by: FAMILY MEDICINE

## 2019-11-11 PROCEDURE — G8484 FLU IMMUNIZE NO ADMIN: HCPCS | Performed by: FAMILY MEDICINE

## 2019-11-11 PROCEDURE — G8427 DOCREV CUR MEDS BY ELIG CLIN: HCPCS | Performed by: FAMILY MEDICINE

## 2019-11-11 PROCEDURE — 1036F TOBACCO NON-USER: CPT | Performed by: FAMILY MEDICINE

## 2019-11-11 RX ORDER — POLYETHYLENE GLYCOL 3350 17 G/17G
17 POWDER, FOR SOLUTION ORAL DAILY PRN
Qty: 1 BOTTLE | Refills: 5 | Status: SHIPPED | OUTPATIENT
Start: 2019-11-11 | End: 2021-11-15 | Stop reason: ALTCHOICE

## 2019-11-11 ASSESSMENT — ENCOUNTER SYMPTOMS
WHEEZING: 0
PHOTOPHOBIA: 0
CHEST TIGHTNESS: 0
DIARRHEA: 0
SHORTNESS OF BREATH: 0
RHINORRHEA: 0
ABDOMINAL DISTENTION: 1
SINUS PRESSURE: 0
RECTAL PAIN: 0
COUGH: 0
CONSTIPATION: 1
BLOOD IN STOOL: 0
ABDOMINAL PAIN: 1
BACK PAIN: 1

## 2019-11-11 ASSESSMENT — PATIENT HEALTH QUESTIONNAIRE - PHQ9
SUM OF ALL RESPONSES TO PHQ9 QUESTIONS 1 & 2: 0
SUM OF ALL RESPONSES TO PHQ QUESTIONS 1-9: 0
2. FEELING DOWN, DEPRESSED OR HOPELESS: 0
SUM OF ALL RESPONSES TO PHQ QUESTIONS 1-9: 0
1. LITTLE INTEREST OR PLEASURE IN DOING THINGS: 0

## 2019-11-13 ENCOUNTER — TELEPHONE (OUTPATIENT)
Dept: FAMILY MEDICINE CLINIC | Age: 24
End: 2019-11-13

## 2019-11-14 ENCOUNTER — NURSE ONLY (OUTPATIENT)
Dept: FAMILY MEDICINE CLINIC | Age: 24
End: 2019-11-14
Payer: COMMERCIAL

## 2019-11-14 ENCOUNTER — HOSPITAL ENCOUNTER (OUTPATIENT)
Age: 24
Setting detail: SPECIMEN
Discharge: HOME | End: 2019-11-14
Payer: COMMERCIAL

## 2019-11-14 DIAGNOSIS — R31.9 URINARY TRACT INFECTION WITH HEMATURIA, SITE UNSPECIFIED: Primary | ICD-10-CM

## 2019-11-14 DIAGNOSIS — N39.0 URINARY TRACT INFECTION WITH HEMATURIA, SITE UNSPECIFIED: Primary | ICD-10-CM

## 2019-11-14 LAB
BILIRUBIN, POC: NEGATIVE
BLOOD URINE, POC: ABNORMAL
CLARITY, POC: ABNORMAL
COLOR, POC: YELLOW
GLUCOSE URINE, POC: NEGATIVE
KETONES, POC: NEGATIVE
LEUKOCYTE EST, POC: ABNORMAL
NITRITE, POC: NEGATIVE
PH, POC: 6
PROTEIN, POC: ABNORMAL
SPECIFIC GRAVITY, POC: 1.03
UROBILINOGEN, POC: 0.2

## 2019-11-14 PROCEDURE — 81002 URINALYSIS NONAUTO W/O SCOPE: CPT | Performed by: FAMILY MEDICINE

## 2019-11-14 PROCEDURE — 99211 OFF/OP EST MAY X REQ PHY/QHP: CPT | Performed by: FAMILY MEDICINE

## 2019-11-15 LAB
CULTURE: NORMAL
Lab: NORMAL
SPECIMEN DESCRIPTION: NORMAL

## 2019-11-17 DIAGNOSIS — N30.01 ACUTE CYSTITIS WITH HEMATURIA: Primary | ICD-10-CM

## 2019-11-17 RX ORDER — CEPHALEXIN 500 MG/1
500 CAPSULE ORAL 2 TIMES DAILY
Qty: 20 CAPSULE | Refills: 0 | Status: SHIPPED | OUTPATIENT
Start: 2019-11-17 | End: 2019-12-05

## 2019-11-19 ENCOUNTER — HOSPITAL ENCOUNTER (OUTPATIENT)
Age: 24
Setting detail: SPECIMEN
Discharge: HOME | End: 2019-11-19
Payer: COMMERCIAL

## 2019-11-19 ENCOUNTER — INITIAL PRENATAL (OUTPATIENT)
Dept: OBGYN CLINIC | Age: 24
End: 2019-11-19
Payer: COMMERCIAL

## 2019-11-19 VITALS
SYSTOLIC BLOOD PRESSURE: 102 MMHG | HEART RATE: 80 BPM | DIASTOLIC BLOOD PRESSURE: 64 MMHG | BODY MASS INDEX: 34.94 KG/M2 | WEIGHT: 173 LBS

## 2019-11-19 DIAGNOSIS — Z34.00 PRIMIGRAVIDA, ANTEPARTUM: ICD-10-CM

## 2019-11-19 DIAGNOSIS — Z3A.26 26 WEEKS GESTATION OF PREGNANCY: ICD-10-CM

## 2019-11-19 DIAGNOSIS — Z3A.26 26 WEEKS GESTATION OF PREGNANCY: Primary | ICD-10-CM

## 2019-11-19 DIAGNOSIS — O23.40 URINARY TRACT INFECTION IN MOTHER DURING PREGNANCY, ANTEPARTUM: ICD-10-CM

## 2019-11-19 DIAGNOSIS — Z82.79 FAMILY HISTORY OF SPINA BIFIDA: ICD-10-CM

## 2019-11-19 DIAGNOSIS — A60.04 HERPES SIMPLEX VULVOVAGINITIS: ICD-10-CM

## 2019-11-19 PROCEDURE — 59899 UNLISTED PX MAT CARE&DLVR: CPT | Performed by: NURSE PRACTITIONER

## 2019-11-19 PROCEDURE — 87070 CULTURE OTHR SPECIMN AEROBIC: CPT

## 2019-11-19 PROCEDURE — 99213 OFFICE O/P EST LOW 20 MIN: CPT | Performed by: NURSE PRACTITIONER

## 2019-11-19 PROCEDURE — G0145 SCR C/V CYTO,THINLAYER,RESCR: HCPCS

## 2019-11-19 RX ORDER — PNV NO.95/FERROUS FUM/FOLIC AC 28MG-0.8MG
1 TABLET ORAL DAILY
Qty: 30 TABLET | Refills: 6 | Status: CANCELLED | OUTPATIENT
Start: 2019-11-19

## 2019-11-19 RX ORDER — VALACYCLOVIR HYDROCHLORIDE 500 MG/1
500 TABLET, FILM COATED ORAL 2 TIMES DAILY
Qty: 60 TABLET | Refills: 2 | Status: SHIPPED | OUTPATIENT
Start: 2019-11-19 | End: 2019-12-19

## 2019-11-22 LAB
CULTURE: NORMAL
Lab: NORMAL
SPECIMEN DESCRIPTION: NORMAL

## 2019-11-26 LAB — CYTOLOGY REPORT: NORMAL

## 2019-12-05 ENCOUNTER — ROUTINE PRENATAL (OUTPATIENT)
Dept: OBGYN CLINIC | Age: 24
End: 2019-12-05
Payer: COMMERCIAL

## 2019-12-05 ENCOUNTER — TELEPHONE (OUTPATIENT)
Dept: OBGYN CLINIC | Age: 24
End: 2019-12-05

## 2019-12-05 ENCOUNTER — HOSPITAL ENCOUNTER (OUTPATIENT)
Age: 24
Discharge: HOME | End: 2019-12-05
Payer: COMMERCIAL

## 2019-12-05 VITALS
SYSTOLIC BLOOD PRESSURE: 104 MMHG | DIASTOLIC BLOOD PRESSURE: 68 MMHG | BODY MASS INDEX: 34.13 KG/M2 | WEIGHT: 169 LBS | HEART RATE: 80 BPM

## 2019-12-05 DIAGNOSIS — A60.04 HERPES SIMPLEX VULVOVAGINITIS: ICD-10-CM

## 2019-12-05 DIAGNOSIS — Z34.00 PRIMIGRAVIDA, ANTEPARTUM: ICD-10-CM

## 2019-12-05 DIAGNOSIS — Z82.79 FAMILY HISTORY OF SPINA BIFIDA: ICD-10-CM

## 2019-12-05 DIAGNOSIS — O99.810 ABNORMAL GLUCOSE TOLERANCE IN PREGNANCY: Primary | ICD-10-CM

## 2019-12-05 DIAGNOSIS — O09.93 HIGH-RISK PREGNANCY IN THIRD TRIMESTER: Primary | ICD-10-CM

## 2019-12-05 DIAGNOSIS — Z23 NEED FOR TDAP VACCINATION: ICD-10-CM

## 2019-12-05 DIAGNOSIS — Z3A.28 28 WEEKS GESTATION OF PREGNANCY: ICD-10-CM

## 2019-12-05 DIAGNOSIS — Z3A.26 26 WEEKS GESTATION OF PREGNANCY: ICD-10-CM

## 2019-12-05 DIAGNOSIS — O23.40 URINARY TRACT INFECTION IN MOTHER DURING PREGNANCY, ANTEPARTUM: ICD-10-CM

## 2019-12-05 LAB
ABSOLUTE EOS #: 0.1 K/UL (ref 0–0.4)
ABSOLUTE IMMATURE GRANULOCYTE: ABNORMAL K/UL (ref 0–0.3)
ABSOLUTE LYMPH #: 2.2 K/UL (ref 1–4.8)
ABSOLUTE MONO #: 0.6 K/UL (ref 0.1–1.3)
BASOPHILS # BLD: 1 % (ref 0–2)
BASOPHILS ABSOLUTE: 0.1 K/UL (ref 0–0.2)
DIFFERENTIAL TYPE: ABNORMAL
EOSINOPHILS RELATIVE PERCENT: 1 % (ref 0–4)
GLUCOSE ADMINISTRATION: ABNORMAL
GLUCOSE TOLERANCE SCREEN 50G: 173 MG/DL (ref 70–135)
HCT VFR BLD CALC: 32.2 % (ref 36–46)
HEMOGLOBIN: 10.9 G/DL (ref 12–16)
IMMATURE GRANULOCYTES: ABNORMAL %
LYMPHOCYTES # BLD: 17 % (ref 24–44)
MCH RBC QN AUTO: 28.6 PG (ref 26–34)
MCHC RBC AUTO-ENTMCNC: 33.9 G/DL (ref 31–37)
MCV RBC AUTO: 84.2 FL (ref 80–100)
MONOCYTES # BLD: 5 % (ref 1–7)
NRBC AUTOMATED: ABNORMAL PER 100 WBC
PDW BLD-RTO: 13.7 % (ref 11.5–14.9)
PLATELET # BLD: 250 K/UL (ref 150–450)
PLATELET ESTIMATE: ABNORMAL
PMV BLD AUTO: 8 FL (ref 6–12)
RBC # BLD: 3.82 M/UL (ref 4–5.2)
RBC # BLD: ABNORMAL 10*6/UL
SEG NEUTROPHILS: 76 % (ref 36–66)
SEGMENTED NEUTROPHILS ABSOLUTE COUNT: 10 K/UL (ref 1.3–9.1)
TOXOPLASM IGM: 0.39 INDEX
TOXOPLASMA BLOOD FOR RATIO: <0.5 IU/ML
TSH SERPL DL<=0.05 MIU/L-ACNC: 1.16 MIU/L (ref 0.3–5)
WBC # BLD: 13 K/UL (ref 3.5–11)
WBC # BLD: ABNORMAL 10*3/UL

## 2019-12-05 PROCEDURE — 99213 OFFICE O/P EST LOW 20 MIN: CPT | Performed by: NURSE PRACTITIONER

## 2019-12-05 PROCEDURE — 82950 GLUCOSE TEST: CPT

## 2019-12-05 PROCEDURE — 84443 ASSAY THYROID STIM HORMONE: CPT

## 2019-12-05 PROCEDURE — 90471 IMMUNIZATION ADMIN: CPT | Performed by: NURSE PRACTITIONER

## 2019-12-05 PROCEDURE — 86778 TOXOPLASMA ANTIBODY IGM: CPT

## 2019-12-05 PROCEDURE — 36415 COLL VENOUS BLD VENIPUNCTURE: CPT

## 2019-12-05 PROCEDURE — 87086 URINE CULTURE/COLONY COUNT: CPT

## 2019-12-05 PROCEDURE — 85025 COMPLETE CBC W/AUTO DIFF WBC: CPT

## 2019-12-05 PROCEDURE — 90715 TDAP VACCINE 7 YRS/> IM: CPT | Performed by: NURSE PRACTITIONER

## 2019-12-05 PROCEDURE — 86777 TOXOPLASMA ANTIBODY: CPT

## 2019-12-05 RX ORDER — FERROUS SULFATE 325(65) MG
325 TABLET ORAL
Qty: 60 TABLET | Refills: 2 | Status: ON HOLD | OUTPATIENT
Start: 2019-12-05 | End: 2020-02-19 | Stop reason: SDUPTHER

## 2019-12-06 LAB
CULTURE: NORMAL
Lab: NORMAL
SPECIMEN DESCRIPTION: NORMAL

## 2019-12-11 ENCOUNTER — HOSPITAL ENCOUNTER (OUTPATIENT)
Age: 24
Discharge: HOME | End: 2019-12-11
Payer: COMMERCIAL

## 2019-12-11 ENCOUNTER — ROUTINE PRENATAL (OUTPATIENT)
Dept: PERINATAL CARE | Age: 24
End: 2019-12-11
Payer: COMMERCIAL

## 2019-12-11 VITALS
HEIGHT: 59 IN | BODY MASS INDEX: 34.68 KG/M2 | DIASTOLIC BLOOD PRESSURE: 63 MMHG | WEIGHT: 172 LBS | TEMPERATURE: 98.1 F | HEART RATE: 100 BPM | SYSTOLIC BLOOD PRESSURE: 107 MMHG | RESPIRATION RATE: 16 BRPM

## 2019-12-11 DIAGNOSIS — O99.213 OBESITY AFFECTING PREGNANCY IN THIRD TRIMESTER: ICD-10-CM

## 2019-12-11 DIAGNOSIS — O35.9XX0 KNOWN FETAL ANOMALY, ANTEPARTUM, SINGLE OR UNSPECIFIED FETUS: Primary | ICD-10-CM

## 2019-12-11 DIAGNOSIS — O35.2XX0 HEREDITARY FAMILIAL DISEASE AFFECTING MANAGEMENT OF MOTHER AND POSSIBLY AFFECTING FETUS, ANTEPARTUM, SINGLE OR UNSPECIFIED FETUS: ICD-10-CM

## 2019-12-11 DIAGNOSIS — O99.810 ABNORMAL MATERNAL GLUCOSE TOLERANCE, ANTEPARTUM: ICD-10-CM

## 2019-12-11 DIAGNOSIS — Z3A.29 29 WEEKS GESTATION OF PREGNANCY: ICD-10-CM

## 2019-12-11 PROBLEM — Z3A.23 23 WEEKS GESTATION OF PREGNANCY: Status: RESOLVED | Noted: 2019-10-27 | Resolved: 2019-12-11

## 2019-12-11 LAB
SEND OUT REPORT: NORMAL
TEST NAME: NORMAL

## 2019-12-11 PROCEDURE — 99242 OFF/OP CONSLTJ NEW/EST SF 20: CPT | Performed by: OBSTETRICS & GYNECOLOGY

## 2019-12-11 PROCEDURE — 36415 COLL VENOUS BLD VENIPUNCTURE: CPT

## 2019-12-11 PROCEDURE — G8417 CALC BMI ABV UP PARAM F/U: HCPCS | Performed by: OBSTETRICS & GYNECOLOGY

## 2019-12-11 PROCEDURE — G8484 FLU IMMUNIZE NO ADMIN: HCPCS | Performed by: OBSTETRICS & GYNECOLOGY

## 2019-12-11 PROCEDURE — 76819 FETAL BIOPHYS PROFIL W/O NST: CPT | Performed by: OBSTETRICS & GYNECOLOGY

## 2019-12-11 PROCEDURE — 76811 OB US DETAILED SNGL FETUS: CPT | Performed by: OBSTETRICS & GYNECOLOGY

## 2019-12-11 PROCEDURE — G8427 DOCREV CUR MEDS BY ELIG CLIN: HCPCS | Performed by: OBSTETRICS & GYNECOLOGY

## 2019-12-12 PROBLEM — O99.810 ABNORMAL GLUCOSE TOLERANCE TEST (GTT) DURING PREGNANCY, ANTEPARTUM: Status: ACTIVE | Noted: 2019-12-12

## 2019-12-12 PROBLEM — Z83.3 FAMILY HISTORY OF DIABETES MELLITUS: Status: ACTIVE | Noted: 2019-12-12

## 2019-12-12 PROBLEM — O99.519 ASTHMA AFFECTING PREGNANCY, ANTEPARTUM: Status: ACTIVE | Noted: 2019-12-12

## 2019-12-12 PROBLEM — J45.909 ASTHMA AFFECTING PREGNANCY, ANTEPARTUM: Status: ACTIVE | Noted: 2019-12-12

## 2019-12-17 ENCOUNTER — TELEPHONE (OUTPATIENT)
Dept: PERINATAL CARE | Age: 24
End: 2019-12-17

## 2019-12-18 ENCOUNTER — ROUTINE PRENATAL (OUTPATIENT)
Dept: OBGYN CLINIC | Age: 24
End: 2019-12-18
Payer: COMMERCIAL

## 2019-12-18 VITALS
HEART RATE: 93 BPM | SYSTOLIC BLOOD PRESSURE: 98 MMHG | BODY MASS INDEX: 34.74 KG/M2 | WEIGHT: 172 LBS | DIASTOLIC BLOOD PRESSURE: 60 MMHG

## 2019-12-18 DIAGNOSIS — O23.40 URINARY TRACT INFECTION IN MOTHER DURING PREGNANCY, ANTEPARTUM: ICD-10-CM

## 2019-12-18 DIAGNOSIS — O99.519 ASTHMA AFFECTING PREGNANCY, ANTEPARTUM: ICD-10-CM

## 2019-12-18 DIAGNOSIS — O09.93 HIGH-RISK PREGNANCY IN THIRD TRIMESTER: Primary | ICD-10-CM

## 2019-12-18 DIAGNOSIS — Z34.00 PRIMIGRAVIDA, ANTEPARTUM: ICD-10-CM

## 2019-12-18 DIAGNOSIS — Z3A.30 30 WEEKS GESTATION OF PREGNANCY: ICD-10-CM

## 2019-12-18 DIAGNOSIS — O99.810 ABNORMAL GLUCOSE TOLERANCE TEST (GTT) DURING PREGNANCY, ANTEPARTUM: ICD-10-CM

## 2019-12-18 DIAGNOSIS — Z82.79 FAMILY HISTORY OF SPINA BIFIDA: ICD-10-CM

## 2019-12-18 DIAGNOSIS — Z83.3 FAMILY HISTORY OF DIABETES MELLITUS: ICD-10-CM

## 2019-12-18 DIAGNOSIS — N83.201 RIGHT OVARIAN CYST: ICD-10-CM

## 2019-12-18 DIAGNOSIS — J45.909 ASTHMA AFFECTING PREGNANCY, ANTEPARTUM: ICD-10-CM

## 2019-12-18 DIAGNOSIS — A60.04 HERPES SIMPLEX VULVOVAGINITIS: ICD-10-CM

## 2019-12-18 PROCEDURE — G8417 CALC BMI ABV UP PARAM F/U: HCPCS | Performed by: OBSTETRICS & GYNECOLOGY

## 2019-12-18 PROCEDURE — 1036F TOBACCO NON-USER: CPT | Performed by: OBSTETRICS & GYNECOLOGY

## 2019-12-18 PROCEDURE — 99213 OFFICE O/P EST LOW 20 MIN: CPT | Performed by: OBSTETRICS & GYNECOLOGY

## 2019-12-18 PROCEDURE — G8484 FLU IMMUNIZE NO ADMIN: HCPCS | Performed by: OBSTETRICS & GYNECOLOGY

## 2019-12-18 PROCEDURE — G8427 DOCREV CUR MEDS BY ELIG CLIN: HCPCS | Performed by: OBSTETRICS & GYNECOLOGY

## 2019-12-31 ENCOUNTER — HOSPITAL ENCOUNTER (OUTPATIENT)
Age: 24
Discharge: HOME | End: 2019-12-31
Attending: OBSTETRICS & GYNECOLOGY | Admitting: OBSTETRICS & GYNECOLOGY
Payer: COMMERCIAL

## 2019-12-31 VITALS
RESPIRATION RATE: 16 BRPM | SYSTOLIC BLOOD PRESSURE: 118 MMHG | DIASTOLIC BLOOD PRESSURE: 66 MMHG | HEART RATE: 105 BPM | TEMPERATURE: 98 F

## 2019-12-31 PROBLEM — Z3A.32 32 WEEKS GESTATION OF PREGNANCY: Status: ACTIVE | Noted: 2019-12-31

## 2019-12-31 PROCEDURE — 59025 FETAL NON-STRESS TEST: CPT

## 2019-12-31 PROCEDURE — 76818 FETAL BIOPHYS PROFILE W/NST: CPT

## 2019-12-31 NOTE — PROGRESS NOTES
TESTING NOTE    Meryle Serve is a 21 y.o. female  at [place] [address]    The patient was seen and examined. She is here today for  testing for because she is at high risk for the following reasons: BMI >35  The baby is moving well and she denies any complaints. Patient Active Problem List   Diagnosis    ADHD    GERD    Family history of breast cancer in first degree relative    Herpes simplex vulvovaginitis II    HPV (human papilloma virus) infection    Mild intermittent asthma    Anxiety    Major Depressive Disorder    Chronic pain of right knee    Hx of Helicobacter gastritis    BMI 35.0 to 35.9     Family history of spina bifida (brother)    Right ovarian cyst    IBS with constipation/diarrhea    Constipation    Acute midline low back pain without sciatica    Primigravida, antepartum    UTI in pregnancy    Abnormal glucose tolerance test (GTT) during pregnancy, antepartum    Asthma affecting pregnancy, antepartum    Family history of diabetes mellitus    32 weeks gestation of pregnancy       Vitals:  Vitals:    19 1116   BP: 118/66   Pulse: 105   Resp: 16   Temp: 98 °F (36.7 °C)   TempSrc: Oral         NST:   Fetal heart rate baseline: 135, moderate variability, accelerations present, decelerations absent    The tracing has been reviewed and is considered reactive. Biophysical Profile:   Amniotic Fluid Index: 13cm  Tone: Present  Movement: Present  Breathing: Present    Biophysical Score: 8 / 8    Fetal Position: Cephalic    Assessment/Plan:  1. Meryle Serve is a 21 y.o. female  at 32w2d  2. NST/ BPP 10/10   - The patient will continue with her scheduled office appointments. Next appointment is on 19.    - She will continue with her  testing as scheduled. - Signs and Symptoms of  labor precautions were reviewed.    - She was counseled on adequate hydration prior to discharge   - The patient was instructed on fetal kick counts. 3. Discussed results with Dr. Kaaren Kawasaki who is agreeable to the above plan.      Yusuf Potter DO  Ob/Gyn Resident   12/31/2019, 11:22 AM

## 2020-01-02 ENCOUNTER — ROUTINE PRENATAL (OUTPATIENT)
Dept: OBGYN CLINIC | Age: 25
End: 2020-01-02
Payer: COMMERCIAL

## 2020-01-02 VITALS
DIASTOLIC BLOOD PRESSURE: 70 MMHG | BODY MASS INDEX: 33.93 KG/M2 | SYSTOLIC BLOOD PRESSURE: 116 MMHG | HEART RATE: 106 BPM | WEIGHT: 168 LBS

## 2020-01-02 PROCEDURE — G8417 CALC BMI ABV UP PARAM F/U: HCPCS | Performed by: ADVANCED PRACTICE MIDWIFE

## 2020-01-02 PROCEDURE — G8484 FLU IMMUNIZE NO ADMIN: HCPCS | Performed by: ADVANCED PRACTICE MIDWIFE

## 2020-01-02 PROCEDURE — 99213 OFFICE O/P EST LOW 20 MIN: CPT | Performed by: ADVANCED PRACTICE MIDWIFE

## 2020-01-02 PROCEDURE — G8427 DOCREV CUR MEDS BY ELIG CLIN: HCPCS | Performed by: ADVANCED PRACTICE MIDWIFE

## 2020-01-02 PROCEDURE — 1036F TOBACCO NON-USER: CPT | Performed by: ADVANCED PRACTICE MIDWIFE

## 2020-01-02 RX ORDER — AZITHROMYCIN 250 MG/1
250 TABLET, FILM COATED ORAL DAILY
Qty: 6 TABLET | Refills: 0 | Status: SHIPPED | OUTPATIENT
Start: 2020-01-02 | End: 2020-01-08

## 2020-01-02 NOTE — PROGRESS NOTES
Meryle Serve is a 21 y.o. female 32w4d        OB History    Para Term  AB Living   1 0 0 0 0 0   SAB TAB Ectopic Molar Multiple Live Births   0 0 0   0        # Outcome Date GA Lbr Narayan/2nd Weight Sex Delivery Anes PTL Lv   1 Current                Vitals  BP: 116/70  Weight: 168 lb (76.2 kg)  Pulse: 106  Patient Position: Sitting  Albumin: Negative  Glucose: Negative      The patient was seen and evaluated. There was positive fetal movements. No contractions or leakage of fluid. Signs and symptoms of  labor as well as labor were reviewed. The S/S of Pre-Eclampsia were reviewed with the patient in detail. She is to report any of these if they occur. She currently denies any of these. The patient had her 28 week labs completed. 19 TDAP given  Flu vaccine declined  Dr Barney Martinez to change to Dr Sonja Narayanan per TE.  csp  Last seen GI Dr. Sonja Narayanan 2019  testing      T-Dap Vaccine (27-36 weeks): completed    The patient was instructed on fetal kick counts and was given a kick sheet to complete every 8 hours. She was instructed that the baby should move at a minimum of ten times within one hour after a meal. The patient was instructed to lay down on her left side twenty minutes after eating and count movements for up to one hour with a target value of ten movements. She was instructed to notify the office if she did not make that target after two attempts or if after any attempt there was less than four movements. The patient reports that the targets have been made Yes.  Testing:      Assessment:  1. Meryle Serve is a 21 y.o. female  2.    3. 32w4d    Patient Active Problem List    Diagnosis Date Noted    Asthma affecting pregnancy, antepartum 2019     Priority: High    Family history of diabetes mellitus 2019     Priority: High    Primigravida, antepartum 2019     Priority: High    UTI in pregnancy 2019 Priority: High     2019 treated with Keflex- ER      Herpes simplex vulvovaginitis II 2016     Priority: High      if outbreak at term  Valtrex at 36 weeks   STD counseling and barrier RECS      ADHD 2011     Priority: High     Class: Chronic    HPV (human papilloma virus) infection 2016     Priority: Medium     Counseling on Laryngeal Papillomatosis:     Laryngeal papillomatosis, also known as recurrent respiratory papillomatosis or glottal papillomatosis, is a rare medical condition (2 per 100,000 adults and 4.5 per 100,000 children)[1]:411, caused by a HPV infection of the throat.[2]:411 Laryngeal papillomatosis causes assorted tumors or papillomas to develop over a period of time. It usually develops from strains 6 or 11 of the HPV virus. Route of delivery with morbidity rates of both vaginal and  section were reviewed in detail. The patient has elected to proceed with a vaginal delivery unless the lesions become obstructive precluding a vaginal delivery. She was counseled on the potential risks to her child both short and long term sequelae.  Family history of breast cancer in first degree relative      Priority: Medium     PGM, Paunt in 45s ? ?      GERD      Priority: Medium    32 weeks gestation of pregnancy 2019    Abnormal glucose tolerance test (GTT) during pregnancy, antepartum 2019 3 hour GTT and Hgb A1c ordered        Acute midline low back pain without sciatica 2019    Constipation 2019    IBS with constipation/diarrhea 10/28/2019    Family history of spina bifida (brother) 10/27/2019     2019 Follow up in 2 weeks for BPP and fetal echo- per patient prefernce  Declined invasive testing  Patient opted for non-invasive testing        Right ovarian cyst 10/27/2019     2019 1.37x1.32x1.38      BMI 35.0 to 35.9      Hx of Helicobacter gastritis 2019    Chronic pain of right knee 2018    Mild intermittent asthma 2017    Anxiety 2017    Major Depressive Disorder 2017        Diagnosis Orders   1. 32 weeks gestation of pregnancy     2. Asthma affecting pregnancy, antepartum     3. Family history of diabetes mellitus     4. Primigravida, antepartum     5. Urinary tract infection in mother during pregnancy, antepartum     6. Herpes simplex vulvovaginitis     7. Abnormal glucose tolerance test (GTT) during pregnancy, antepartum     8. Family history of spina bifida     5. Right ovarian cyst               Plan:  The patient will return to the office for her next visit in 2 weeks. See antepartum flow sheet.    Encouraged to complete all blood work, 3 hour GTT and Hgb A1c reprinted  Norman Clemente ordered per patient request   Testing Indicated: No  Scheduled with Nursing-Pt notified: No

## 2020-01-13 RX ORDER — OMEPRAZOLE 20 MG/1
CAPSULE, DELAYED RELEASE ORAL
Qty: 30 CAPSULE | Refills: 3 | Status: SHIPPED | OUTPATIENT
Start: 2020-01-13 | End: 2020-04-28

## 2020-01-16 ENCOUNTER — ROUTINE PRENATAL (OUTPATIENT)
Dept: OBGYN CLINIC | Age: 25
End: 2020-01-16
Payer: COMMERCIAL

## 2020-01-16 VITALS
DIASTOLIC BLOOD PRESSURE: 72 MMHG | SYSTOLIC BLOOD PRESSURE: 108 MMHG | BODY MASS INDEX: 34.74 KG/M2 | WEIGHT: 172 LBS | HEART RATE: 76 BPM

## 2020-01-16 PROCEDURE — G8417 CALC BMI ABV UP PARAM F/U: HCPCS | Performed by: NURSE PRACTITIONER

## 2020-01-16 PROCEDURE — 99213 OFFICE O/P EST LOW 20 MIN: CPT | Performed by: NURSE PRACTITIONER

## 2020-01-16 PROCEDURE — G8484 FLU IMMUNIZE NO ADMIN: HCPCS | Performed by: NURSE PRACTITIONER

## 2020-01-16 PROCEDURE — G8427 DOCREV CUR MEDS BY ELIG CLIN: HCPCS | Performed by: NURSE PRACTITIONER

## 2020-01-16 PROCEDURE — 1036F TOBACCO NON-USER: CPT | Performed by: NURSE PRACTITIONER

## 2020-01-16 NOTE — PROGRESS NOTES
Gela Ricks is a 21 y.o. female 34w4d        OB History    Para Term  AB Living   1 0 0 0 0 0   SAB TAB Ectopic Molar Multiple Live Births   0 0 0   0        # Outcome Date GA Lbr Narayan/2nd Weight Sex Delivery Anes PTL Lv   1 Current                Vitals  BP: 108/72  Weight: 172 lb (78 kg)  Pulse: 76  Patient Position: Sitting  Albumin: Negative  Glucose: Negative  Fundal Height (cm): 34 cm  Fetal Heart Rate: 140  Movement: Present      The patient was seen and evaluated. There was positive fetal movements. No contractions or leakage of fluid. Signs and symptoms of  labor as well as labor were reviewed. The S/S of Pre-Eclampsia were reviewed with the patient in detail. She is to report any of these if they occur. She currently denies any of these. The patient had her 28 week labs ordered. 19 TDAP given  Flu vaccine declined  Dr Bakari Wright to change to Dr Leonor Yanez per TE.  csp  Last seen GI Dr. Leonor Yanez 2019  testing      T-Dap Vaccine (27-36 weeks): completed     The patient was instructed on fetal kick counts and was given a kick sheet to complete every 8 hours. She was instructed that the baby should move at a minimum of ten times within one hour after a meal. The patient was instructed to lay down on her left side twenty minutes after eating and count movements for up to one hour with a target value of ten movements. She was instructed to notify the office if she did not make that target after two attempts or if after any attempt there was less than four movements. The patient reports that the targets have been made Yes.  Testing:  Not indicated     Assessment:  1. Gela Ricks is a 21 y.o. female  2.    3. 34w4d    Patient Active Problem List    Diagnosis Date Noted    Abnormal glucose tolerance test (GTT) during pregnancy, antepartum 2019     Priority: High     2019 3 hour GTT and Hgb A1c ordered        Asthma affecting pregnancy, antepartum 2019     Priority: High    Family history of diabetes mellitus 2019     Priority: High    Primigravida, antepartum 2019     Priority: High    UTI in pregnancy 2019     Priority: High     2019 treated with Keflex- ER      Right ovarian cyst 10/27/2019     Priority: High     2019 1.37x1.32x1.38      Herpes simplex vulvovaginitis II 2016     Priority: High      if outbreak at term  Valtrex at 36 weeks   STD counseling and barrier RECS      ADHD 2011     Priority: High     Class: Chronic    HPV (human papilloma virus) infection 2016     Priority: Medium     Counseling on Laryngeal Papillomatosis:     Laryngeal papillomatosis, also known as recurrent respiratory papillomatosis or glottal papillomatosis, is a rare medical condition (2 per 100,000 adults and 4.5 per 100,000 children)[1]:411, caused by a HPV infection of the throat.[2]:411 Laryngeal papillomatosis causes assorted tumors or papillomas to develop over a period of time. It usually develops from strains 6 or 11 of the HPV virus. Route of delivery with morbidity rates of both vaginal and  section were reviewed in detail. The patient has elected to proceed with a vaginal delivery unless the lesions become obstructive precluding a vaginal delivery. She was counseled on the potential risks to her child both short and long term sequelae.  Family history of breast cancer in first degree relative      Priority: Medium     PGM, Paunt in 45s ? ?      GERD      Priority: Medium    32 weeks gestation of pregnancy 2019    Acute midline low back pain without sciatica 2019    Constipation 2019    IBS with constipation/diarrhea 10/28/2019    Family history of spina bifida (brother) 10/27/2019     2019 Follow up in 2 weeks for BPP and fetal echo- per patient prefernce  Declined invasive testing  Patient opted for non-invasive testing        BMI 35.0 to 35.9      Hx of Helicobacter gastritis 2019    Chronic pain of right knee 2018    Mild intermittent asthma 2017    Anxiety 2017    Major Depressive Disorder 2017        Diagnosis Orders   1. High-risk pregnancy in third trimester  Rubella antibody, IgG   2. Asthma affecting pregnancy, antepartum     3. Family history of diabetes mellitus     4. Primigravida, antepartum     5. Urinary tract infection in mother during pregnancy, antepartum     6. Herpes simplex vulvovaginitis     7. Abnormal glucose tolerance test (GTT) during pregnancy, antepartum     8. Family history of spina bifida     5. Right ovarian cyst     10. 34 weeks gestation of pregnancy  Rubella antibody, IgG             Plan:  The patient will return to the office for her next visit in 2 weeks. See antepartum flow sheet.    Lab slip reprinted for 3 hour GTT and hgb A1c  Encouraged to complete labs  Lab slip given   Reviewed s/sx of  lab      Testing Indicated: No  Scheduled with Nursing-Pt notified: No

## 2020-01-29 ENCOUNTER — TELEPHONE (OUTPATIENT)
Dept: PERINATAL CARE | Age: 25
End: 2020-01-29

## 2020-01-31 ENCOUNTER — ROUTINE PRENATAL (OUTPATIENT)
Dept: OBGYN CLINIC | Age: 25
End: 2020-01-31
Payer: COMMERCIAL

## 2020-01-31 ENCOUNTER — HOSPITAL ENCOUNTER (OUTPATIENT)
Age: 25
Setting detail: SPECIMEN
Discharge: HOME | End: 2020-01-31
Payer: COMMERCIAL

## 2020-01-31 VITALS
SYSTOLIC BLOOD PRESSURE: 104 MMHG | HEART RATE: 90 BPM | WEIGHT: 179 LBS | BODY MASS INDEX: 36.15 KG/M2 | DIASTOLIC BLOOD PRESSURE: 80 MMHG

## 2020-01-31 LAB — RUBV IGG SER QL: 42.8 IU/ML

## 2020-01-31 PROCEDURE — 86762 RUBELLA ANTIBODY: CPT

## 2020-01-31 PROCEDURE — 87081 CULTURE SCREEN ONLY: CPT

## 2020-01-31 PROCEDURE — 36415 COLL VENOUS BLD VENIPUNCTURE: CPT

## 2020-01-31 PROCEDURE — G8427 DOCREV CUR MEDS BY ELIG CLIN: HCPCS | Performed by: NURSE PRACTITIONER

## 2020-01-31 PROCEDURE — 99213 OFFICE O/P EST LOW 20 MIN: CPT | Performed by: NURSE PRACTITIONER

## 2020-01-31 PROCEDURE — G8417 CALC BMI ABV UP PARAM F/U: HCPCS | Performed by: NURSE PRACTITIONER

## 2020-01-31 PROCEDURE — 1036F TOBACCO NON-USER: CPT | Performed by: NURSE PRACTITIONER

## 2020-01-31 PROCEDURE — G8484 FLU IMMUNIZE NO ADMIN: HCPCS | Performed by: NURSE PRACTITIONER

## 2020-01-31 NOTE — PROGRESS NOTES
Jitendra Ma is a 21 y.o. female 36w5d        OB History    Para Term  AB Living   1 0 0 0 0 0   SAB TAB Ectopic Molar Multiple Live Births   0 0 0   0        # Outcome Date GA Lbr Narayan/2nd Weight Sex Delivery Anes PTL Lv   1 Current                  Vitals  BP: 104/80  Weight: 179 lb (81.2 kg)  Pulse: 90  Patient Position: Sitting  Albumin: Trace  Glucose: Negative      The patient was seen and evaluated. There was positive fetal movements. No contractions or leakage of fluid. Signs and symptoms of labor were reviewed. The S/S of Pre-Eclampsia were reviewed with the patient in detail. She is to report any of these if they occur. She currently denies any of these. The patient was instructed on fetal kick counts and was given a kick sheet to complete every 8 hours. She was instructed that the baby should move at a minimum of ten times within one hour after a meal. The patient was instructed to lay down on her left side twenty minutes after eating and count movements for up to one hour with a target value of ten movements. She was instructed to notify the office if she did not make that target after two attempts or if after any attempt there was less than four movements. The patient reports that the targets have been made Yes.    19 TDAP given  Flu vaccine declined  Dr Hayde Coto to change to Dr uArora Flowers per TE.  csp  Last seen GI Dr. Aurora Flowers 2019  testing      T-Dap Vaccine (27-36 weeks) Completed: Yes    Allergies: Allergies as of 2020 - Review Complete 2020   Allergen Reaction Noted    Bee venom Swelling 2017       Group Beta Strep collection was completed. yes  GBS Results:   No visits with results within 1 Week(s) from this visit.    Latest known visit with results is:   Hospital Outpatient Visit on 2019   Component Date Value Ref Range Status    Test Name 2019 [facility]   Final    Send Out Report 2019 [facility] RECS      ADHD 2011     Priority: High     Class: Chronic    HPV (human papilloma virus) infection 2016     Priority: Medium     Overview Note:     Counseling on Laryngeal Papillomatosis:     Laryngeal papillomatosis, also known as recurrent respiratory papillomatosis or glottal papillomatosis, is a rare medical condition (2 per 100,000 adults and 4.5 per 100,000 children)[1]:411, caused by a HPV infection of the throat.[2]:411 Laryngeal papillomatosis causes assorted tumors or papillomas to develop over a period of time. It usually develops from strains 6 or 11 of the HPV virus. Route of delivery with morbidity rates of both vaginal and  section were reviewed in detail. The patient has elected to proceed with a vaginal delivery unless the lesions become obstructive precluding a vaginal delivery. She was counseled on the potential risks to her child both short and long term sequelae.  Family history of breast cancer in first degree relative      Priority: Medium     Overview Note:     PGM, Paunt in 45s ? ?      GERD      Priority: Medium    32 weeks gestation of pregnancy 2019    Abnormal glucose tolerance test (GTT) during pregnancy, antepartum 2019     Overview Note:     2019 3 hour GTT and Hgb A1c ordered        Acute midline low back pain without sciatica 2019    Constipation 2019    IBS with constipation/diarrhea 10/28/2019    Right ovarian cyst 10/27/2019     Overview Note:     2019 1.37x1.32x1.38      BMI 35.0 to 35.9      Hx of Helicobacter gastritis 2019    Chronic pain of right knee 2018    Mild intermittent asthma 2017    Anxiety 2017    Major Depressive Disorder 2017        Diagnosis Orders   1. 36 weeks gestation of pregnancy  Strep B Screen, Vaginal / Rectal   2. Asthma affecting pregnancy, antepartum     3. Family history of diabetes mellitus     4. Primigravida, antepartum     5. Urinary tract infection in mother during pregnancy, antepartum     6. Herpes simplex vulvovaginitis     7. Abnormal glucose tolerance test (GTT) during pregnancy, antepartum     8. Family history of spina bifida     5. Right ovarian cyst               Plan:  The patient will return to the office for her next visit in 1 weeks. See antepartum flow sheet. RTO in 1 week. GBS collected. SVE 1/50/-1  Referral to MFM- BPP and fetal echo- left MFM early at her last visit. Lab slips reprinted for 3 hour GTT/Hgb A1c.

## 2020-02-03 LAB
CULTURE: NORMAL
Lab: NORMAL
SPECIMEN DESCRIPTION: NORMAL

## 2020-02-06 ENCOUNTER — ROUTINE PRENATAL (OUTPATIENT)
Dept: OBGYN CLINIC | Age: 25
End: 2020-02-06
Payer: COMMERCIAL

## 2020-02-06 VITALS
DIASTOLIC BLOOD PRESSURE: 80 MMHG | HEART RATE: 97 BPM | BODY MASS INDEX: 36.15 KG/M2 | SYSTOLIC BLOOD PRESSURE: 114 MMHG | WEIGHT: 179 LBS

## 2020-02-06 PROCEDURE — G8484 FLU IMMUNIZE NO ADMIN: HCPCS | Performed by: OBSTETRICS & GYNECOLOGY

## 2020-02-06 PROCEDURE — 1036F TOBACCO NON-USER: CPT | Performed by: OBSTETRICS & GYNECOLOGY

## 2020-02-06 PROCEDURE — G8417 CALC BMI ABV UP PARAM F/U: HCPCS | Performed by: OBSTETRICS & GYNECOLOGY

## 2020-02-06 PROCEDURE — 99213 OFFICE O/P EST LOW 20 MIN: CPT | Performed by: OBSTETRICS & GYNECOLOGY

## 2020-02-06 PROCEDURE — G8427 DOCREV CUR MEDS BY ELIG CLIN: HCPCS | Performed by: OBSTETRICS & GYNECOLOGY

## 2020-02-06 NOTE — PROGRESS NOTES
STREPTOCOCCI   Final   Hospital Outpatient Visit on 2020   Component Date Value Ref Range Status    Rubella Antibody, IGG 2020 42.8  IU/mL Final    Comment:             REFERENCE RANGE:  <5.0       NON-REACTIVE (non-immune)  5.0 TO 9.9 EQUIVOCAL  >=10.0     REACTIVE     (immune)           ]        Cervical Exam was:   1/70%/-2/V/I cm dilated    The literature regarding a questionable link to pitocin augmentation and induction of labor, the assistance of labor contractions and the initiation of contractions to help delivery, have been reviewed with the patient regarding the increased potential of having a  with Attention Deficit Hyperactivity Disorder and or Autism. These two disorders and the ramifications of their impact on a child and the family caring for that child has been reviewed with the patient in detail. She was given the risks, benefits and alternatives of the use of this medication. She has agreed to its use in the delivery of her unborn child if needed at the time of delivery, Yes. The patient was counseled on the mandatory call ahead policy. She has been instructed to call the office at anytime prior to going into the hospital so the on-call physician may direct her to the appropriate facility for care. Exceptions were reviewed including but not limited to: Decreased fetal movement, vaginal Bleeding or hemorrhage, trauma, readily expectant delivery, or any instance where she feels 911 should be utilized. The patient was counseled on Labor & Delivery. Route of delivery and counseling on vaginal, operative vaginal, and  sections were completed with the risks of each to both the patient as well as her baby. The possibility of a blood transfusion was discussed as well. The patient was not opposed to receiving a transfusion if needed.  The patient was counseled on types of analgesia during labor and is considering either Regional or IV medication the risks, benefits

## 2020-02-10 ENCOUNTER — OFFICE VISIT (OUTPATIENT)
Dept: OBGYN CLINIC | Age: 25
End: 2020-02-10
Payer: COMMERCIAL

## 2020-02-10 LAB
ABDOMINAL CIRCUMFERENCE: NORMAL
BIPARIETAL DIAMETER: NORMAL
ESTIMATED FETAL WEIGHT: NORMAL
FEMORAL DIAMETER: NORMAL
HC/AC: NORMAL
HEAD CIRCUMFERENCE: NORMAL

## 2020-02-10 PROCEDURE — 76816 OB US FOLLOW-UP PER FETUS: CPT | Performed by: OBSTETRICS & GYNECOLOGY

## 2020-02-12 ENCOUNTER — ROUTINE PRENATAL (OUTPATIENT)
Dept: PERINATAL CARE | Age: 25
End: 2020-02-12
Payer: COMMERCIAL

## 2020-02-12 VITALS
HEIGHT: 59 IN | WEIGHT: 179 LBS | DIASTOLIC BLOOD PRESSURE: 72 MMHG | HEART RATE: 96 BPM | SYSTOLIC BLOOD PRESSURE: 109 MMHG | BODY MASS INDEX: 36.08 KG/M2 | TEMPERATURE: 98.1 F | RESPIRATION RATE: 16 BRPM

## 2020-02-12 PROCEDURE — 76819 FETAL BIOPHYS PROFIL W/O NST: CPT | Performed by: OBSTETRICS & GYNECOLOGY

## 2020-02-12 PROCEDURE — 76805 OB US >/= 14 WKS SNGL FETUS: CPT | Performed by: OBSTETRICS & GYNECOLOGY

## 2020-02-13 ENCOUNTER — ROUTINE PRENATAL (OUTPATIENT)
Dept: OBGYN CLINIC | Age: 25
End: 2020-02-13
Payer: COMMERCIAL

## 2020-02-13 VITALS
DIASTOLIC BLOOD PRESSURE: 76 MMHG | BODY MASS INDEX: 35.95 KG/M2 | WEIGHT: 178 LBS | SYSTOLIC BLOOD PRESSURE: 112 MMHG | HEART RATE: 90 BPM

## 2020-02-13 PROCEDURE — G8484 FLU IMMUNIZE NO ADMIN: HCPCS | Performed by: OBSTETRICS & GYNECOLOGY

## 2020-02-13 PROCEDURE — 1036F TOBACCO NON-USER: CPT | Performed by: OBSTETRICS & GYNECOLOGY

## 2020-02-13 PROCEDURE — G8427 DOCREV CUR MEDS BY ELIG CLIN: HCPCS | Performed by: OBSTETRICS & GYNECOLOGY

## 2020-02-13 PROCEDURE — G8417 CALC BMI ABV UP PARAM F/U: HCPCS | Performed by: OBSTETRICS & GYNECOLOGY

## 2020-02-13 PROCEDURE — 99213 OFFICE O/P EST LOW 20 MIN: CPT | Performed by: OBSTETRICS & GYNECOLOGY

## 2020-02-13 NOTE — PROGRESS NOTES
Trey Walton is a 21 y.o. female 38w4d        OB History    Para Term  AB Living   1 0 0 0 0 0   SAB TAB Ectopic Molar Multiple Live Births   0 0 0   0        # Outcome Date GA Lbr Narayan/2nd Weight Sex Delivery Anes PTL Lv   1 Current                Vitals  BP: 112/76  Weight: 178 lb (80.7 kg)  Pulse: 90  Patient Position: Sitting  Albumin: Negative  Glucose: Negative      The patient was seen and evaluated. There was positive fetal movements. No contractions or leakage of fluid. Signs and symptoms of labor were reviewed. The S/S of Pre-Eclampsia were reviewed with the patient in detail. She is to report any of these if they occur. She currently denies any of these. The patient was instructed on fetal kick counts and was given a kick sheet to complete every 8 hours. She was instructed that the baby should move at a minimum of ten times within one hour after a meal. The patient was instructed to lay down on her left side twenty minutes after eating and count movements for up to one hour with a target value of ten movements. She was instructed to notify the office if she did not make that target after two attempts or if after any attempt there was less than four movements. The patient reports that the targets have been made Yes.    19 TDAP given  Flu vaccine declined  Dr Emil Shankar to change to Dr Vidya Park per TE.  csp  Last seen GI Dr. Vidya Park 2019  testing      T-Dap Vaccine Completed (27-36 weeks): Yes    Allergies: Allergies as of 2020 - Review Complete 2020   Allergen Reaction Noted    Bee venom Swelling 2017         Group Beta Strep collection was completed. Yes  GBS Results:   Hospital Outpatient Visit on 2020   Component Date Value Ref Range Status    Specimen Description 2020 . VAGINAL SPECIMEN   Final    Special Requests 2020 NOT REPORTED   Final    Culture 2020 NEGATIVE FOR GROUP B STREPTOCOCCI Final   Hospital Outpatient Visit on 2020   Component Date Value Ref Range Status    Rubella Antibody, IGG 2020 42.8  IU/mL Final    Comment:             REFERENCE RANGE:  <5.0       NON-REACTIVE (non-immune)  5.0 TO 9.9 EQUIVOCAL  >=10.0     REACTIVE     (immune)           ]        Cervical Exam was:   1/60%/-2/VI cm dilated    The literature regarding a questionable link to pitocin augmentation and induction of labor, the assistance of labor contractions and the initiation of contractions to help delivery, have been reviewed with the patient regarding the increased potential of having a  with Attention Deficit Hyperactivity Disorder and or Autism. These two disorders and the ramifications of their impact on a child and the family caring for that child has been reviewed with the patient in detail. She was given the risks, benefits and alternatives of the use of this medication. She has agreed to its use in the delivery of her unborn child if needed at the time of delivery, Yes. The patient was counseled on the mandatory call ahead policy. She has been instructed to call the office at anytime prior to going into the hospital so the on-call physician may direct her to the appropriate facility for care. Exceptions were reviewed including but not limited to: Decreased fetal movement, vaginal Bleeding or hemorrhage, trauma, readily expectant delivery, or any instance where she feels 911 should be utilized. The patient was counseled on Labor & Delivery. Route of delivery and counseling on vaginal, operative vaginal, and  sections were completed with the risks of each to both the patient as well as her baby. The possibility of a blood transfusion was discussed as well. The patient was not opposed to receiving a transfusion if needed.  The patient was counseled on types of analgesia during labor and is considering either Regional or IV medication the risks, benefits and alternatives through the placenta (the organ that links the mother and the baby)   Other reasons for induction include:   Water breaks and contractions do not begin   High blood pressure or diabetes in the mother   Infection in the uterus   The baby is not growing properly   Low amniotic fluid level   Possible Complications   The same complications that may occur from a spontaneous delivery also apply to an induced delivery, as well as the following risks:   Stalled labor--If the medicine does not trigger labor, you may need a  section (). Strong contractions--The medicine that causes contractions could make them too strong. Although rare, this can lead to fetal distress and uterine rupture. In the event that your contractions are too strong, your doctor will lower the dose or stop the medicine. Be sure to discuss these risks with your doctor before the procedure. What to Expect   Prior to Procedure   While the same instructions apply for an induced labor as for a spontaneous birth, there are some differences. Do not eat too much before arriving at the hospital. (It is okay to have clear fluids, though.) The medicines can create very strong contractions and could upset your stomach. Contractions slow the digestive process, so your stomach will remain full. This can cause a problem if you need general anesthesia . Description of the Procedure Cervical Ripening   To deliver your baby vaginally, the cervix will need to [place] 8.  This means it needs to soften, thin, and open to prepare for delivery. If your cervix is not doing this already, your doctor may aid this process by giving you medicine, which may be a:   Gel that is applied to the cervix   Suppository put in the vagina   Pill taken by mouth   The cervical ripening process can last for up to a few days.    There are also procedures that your doctor may try to aid cervical ripening, such as:   Strip the membranes (separate your cervix from the tissues around the baby's head)   Expand a small balloon-tipped catheter in the uterus   Place small cylinders that contain a type of sponge-like seaweed into the uterus     Changes in the Cervix During Pregnancy       © 2011 [address] [place] [address].   Contractions   If contractions have not started once your cervix is ripe, your doctor will give you a drug that causes contractions. The drug is a man-made version of a hormone called oxytocin. This hormone is produced by your body during active labor. The drug will be adjusted during labor to strengthen or weaken the contractions. Once contractions begin, the labor and birth process will be the same as a spontaneous delivery. Anesthesia   The same pain medicines are available for an induced labor as for a spontaneous delivery, including:   Pain medicine given into your vein   Epidural block   Spinal block   Local anesthesia   Immediately After Procedure   If everything goes well, after the induction you will vaginally deliver a healthy baby. How Long Will It Take? It can be hours to several days (very rarely) from the time you are induced until the delivery. If your cervix is not ripe when you are scheduled for the induction, labor and delivery could take 2-3 days. It could take longer for first-time mothers and for pre-term babies. How Much Will It Hurt? Labor causes severe pain. Talk to your doctor about ways to manage the pain. [address] Stay   The usual length of stay is 1-3 days. Your doctor may choose to keep you longer if you have any problems. Postoperative Care   The care after an induced labor is the same as for a spontaneous birth. Call Your Doctor   When you go home after having a vaginal delivery, call your doctor if any of the following occurs:    An unexplained fever of 100.4°F (38°C) or above in the first two weeks   Soaking more than one sanitary napkin an hour or if the bleeding level increases   Wounds that become red, swollen, or drain pus   New pain, swelling, or tenderness in your legs   Hot-to-the-touch, significantly reddened, sore breasts   Any cracking or bleeding from the nipple or areola (the dark-colored area of the breast)   Foul-smelling vaginal discharge   Painful urination or a sudden urge to urinate, inability to control urination   Increasing pain in the vaginal area   Cough or chest pain, nausea, or vomiting   Depression, hallucinations, suicidal thoughts, or any thoughts of harming your baby   In case of an emergency, CALL 911 . Shoulder Dystocia   (Stuck Shoulder Delivery) Pronounced: showl-dur dis-TO-see-     Definition   Dystocia is a term used to describe the difficult delivery of a baby. In shoulder dystocia, the baby's head can be delivered, but the shoulders cannot pass through the birth canal. The shoulders are too wide to fit and become lodged behind the mother's pubic bone or the opening of the birth canal.   Typically, babies born with shoulder dystocia do not suffer long-term complications. If complications do occur, they are usually because the baby has become stuck too long in the birth canal.   Complications include: For the baby:   Lack of oxygen   Broken arm or collarbone   Arm nerve damage   Paralysis   For the mother:   Tearing or bruising of the cervix, rectum, or vagina   Bruising to the bladder   Hemorrhaging     Shoulder Dystocia       The baby's shoulder is lodged behind the mother's pubic bone. 2011 [address] [facility].   Causes   There are a variety of reasons why a baby's shoulders may become lodged during delivery.  But, the most common reasons include:   Delivering very large babies with unusually high birth weights   Often caused by diabetes or mothers who are significantly overweight   Mother's pelvic opening being too small to allow the baby's shoulders to fit     Narrow Pelvic Opening       2011 [facility] [facility].   Risk Factors   The following factors increase the chance of a baby suffering from shoulder dystocia:   Mothers who are diabetic   Mothers who are significantly overweight   Mothers, often small themselves, who may have a small pelvic structure   Signs and Symptoms   The signs of shoulder dystocia become apparent when the baby's head is delivered. The delivery does not progress because of the baby's shoulders being lodged in the birth canal behind the pubic bone. Signs also include very large babies who are likely to have problems being delivered vaginally. Diagnosis   Shoulder dystocia cannot actually be diagnosed until delivery. It can sometimes be predicted by determining the weight and size of the fetus and whether a vaginal delivery is safe for the mother and baby. An ultrasound may be done prior to labor in an attempt to determine if the baby is too large to fit safely through the birth canal during delivery. Treatment   Your doctor will decide on the best treatment plan for you. Treatment options include:   Manipulated vaginal deliveryThere are a variety of maneuvers that the doctor can do to help the mother deliver the baby.  delivery ()If the maneuvers do not work, the baby will need to be delivered via a . For babies who are at risk of shoulder dystocia because of their large size, a  may be scheduled. Prevention   Shoulder dystocia cannot be prevented. Babies who are at risk of shoulder dystocia because of large size can be evaluated prior to delivery with regular prenatal care and ultrasound testing. Women with diabetes or who are very overweight should have the size of their babies estimated. Babies at risk for shoulder dystocia should have a  delivery scheduled.      Last Reviewed: 2010 Lillian Webster MD, Rios Cummings   Updated: 2010

## 2020-02-17 ENCOUNTER — APPOINTMENT (OUTPATIENT)
Dept: LABOR AND DELIVERY | Age: 25
DRG: 560 | End: 2020-02-17
Payer: COMMERCIAL

## 2020-02-17 ENCOUNTER — HOSPITAL ENCOUNTER (INPATIENT)
Age: 25
LOS: 2 days | Discharge: HOME | DRG: 560 | End: 2020-02-19
Attending: OBSTETRICS & GYNECOLOGY | Admitting: OBSTETRICS & GYNECOLOGY
Payer: COMMERCIAL

## 2020-02-17 PROBLEM — Z3A.32 32 WEEKS GESTATION OF PREGNANCY: Status: RESOLVED | Noted: 2019-12-31 | Resolved: 2020-02-17

## 2020-02-17 PROBLEM — Z3A.39 39 WEEKS GESTATION OF PREGNANCY: Status: ACTIVE | Noted: 2020-02-17

## 2020-02-17 LAB
-: ABNORMAL
ABO/RH: NORMAL
ABSOLUTE EOS #: 0.1 K/UL (ref 0–0.4)
ABSOLUTE IMMATURE GRANULOCYTE: ABNORMAL K/UL (ref 0–0.3)
ABSOLUTE LYMPH #: 3 K/UL (ref 1–4.8)
ABSOLUTE MONO #: 0.7 K/UL (ref 0.1–1.3)
AMORPHOUS: ABNORMAL
AMPHETAMINE SCREEN URINE: NEGATIVE
ANTIBODY SCREEN: NEGATIVE
ARM BAND NUMBER: NORMAL
BACTERIA: ABNORMAL
BARBITURATE SCREEN URINE: NEGATIVE
BASOPHILS # BLD: 1 % (ref 0–2)
BASOPHILS ABSOLUTE: 0.1 K/UL (ref 0–0.2)
BENZODIAZEPINE SCREEN, URINE: NEGATIVE
BILIRUBIN URINE: ABNORMAL
BUPRENORPHINE URINE: NORMAL
CANNABINOID SCREEN URINE: NEGATIVE
CASTS UA: ABNORMAL /LPF
COCAINE METABOLITE, URINE: NEGATIVE
COLOR: YELLOW
COMMENT UA: ABNORMAL
CRYSTALS, UA: ABNORMAL /HPF
DIFFERENTIAL TYPE: ABNORMAL
EOSINOPHILS RELATIVE PERCENT: 1 % (ref 0–4)
EPITHELIAL CELLS UA: ABNORMAL /HPF
EXPIRATION DATE: NORMAL
GLUCOSE URINE: NEGATIVE
HCT VFR BLD CALC: 35.5 % (ref 36–46)
HEMOGLOBIN: 11.8 G/DL (ref 12–16)
IMMATURE GRANULOCYTES: ABNORMAL %
KETONES, URINE: ABNORMAL
LEUKOCYTE ESTERASE, URINE: ABNORMAL
LYMPHOCYTES # BLD: 26 % (ref 24–44)
MCH RBC QN AUTO: 27.7 PG (ref 26–34)
MCHC RBC AUTO-ENTMCNC: 33.2 G/DL (ref 31–37)
MCV RBC AUTO: 83.3 FL (ref 80–100)
MDMA URINE: NORMAL
METHADONE SCREEN, URINE: NEGATIVE
METHAMPHETAMINE, URINE: NORMAL
MONOCYTES # BLD: 6 % (ref 1–7)
MUCUS: ABNORMAL
NITRITE, URINE: NEGATIVE
NRBC AUTOMATED: ABNORMAL PER 100 WBC
OPIATES, URINE: NEGATIVE
OTHER OBSERVATIONS UA: ABNORMAL
OXYCODONE SCREEN URINE: NEGATIVE
PDW BLD-RTO: 14.7 % (ref 11.5–14.9)
PH UA: 6 (ref 5–8)
PHENCYCLIDINE, URINE: NEGATIVE
PLATELET # BLD: 170 K/UL (ref 150–450)
PLATELET ESTIMATE: ABNORMAL
PMV BLD AUTO: 10.5 FL (ref 6–12)
PROPOXYPHENE, URINE: NORMAL
PROTEIN UA: ABNORMAL
RBC # BLD: 4.26 M/UL (ref 4–5.2)
RBC # BLD: ABNORMAL 10*6/UL
RBC UA: ABNORMAL /HPF
RENAL EPITHELIAL, UA: ABNORMAL /HPF
SEG NEUTROPHILS: 66 % (ref 36–66)
SEGMENTED NEUTROPHILS ABSOLUTE COUNT: 7.5 K/UL (ref 1.3–9.1)
SPECIFIC GRAVITY UA: 1.03 (ref 1–1.03)
T. PALLIDUM, IGG: NONREACTIVE
TEST INFORMATION: NORMAL
TRICHOMONAS: ABNORMAL
TRICYCLIC ANTIDEPRESSANTS, UR: NORMAL
TURBIDITY: ABNORMAL
URINE HGB: NEGATIVE
UROBILINOGEN, URINE: NORMAL
WBC # BLD: 11.3 K/UL (ref 3.5–11)
WBC # BLD: ABNORMAL 10*3/UL
WBC UA: ABNORMAL /HPF
YEAST: ABNORMAL

## 2020-02-17 PROCEDURE — 81001 URINALYSIS AUTO W/SCOPE: CPT

## 2020-02-17 PROCEDURE — 6370000000 HC RX 637 (ALT 250 FOR IP): Performed by: STUDENT IN AN ORGANIZED HEALTH CARE EDUCATION/TRAINING PROGRAM

## 2020-02-17 PROCEDURE — 59200 INSERT CERVICAL DILATOR: CPT

## 2020-02-17 PROCEDURE — 86850 RBC ANTIBODY SCREEN: CPT

## 2020-02-17 PROCEDURE — 87086 URINE CULTURE/COLONY COUNT: CPT

## 2020-02-17 PROCEDURE — 3E0P7VZ INTRODUCTION OF HORMONE INTO FEMALE REPRODUCTIVE, VIA NATURAL OR ARTIFICIAL OPENING: ICD-10-PCS | Performed by: OBSTETRICS & GYNECOLOGY

## 2020-02-17 PROCEDURE — 76815 OB US LIMITED FETUS(S): CPT

## 2020-02-17 PROCEDURE — 6360000002 HC RX W HCPCS: Performed by: STUDENT IN AN ORGANIZED HEALTH CARE EDUCATION/TRAINING PROGRAM

## 2020-02-17 PROCEDURE — 36415 COLL VENOUS BLD VENIPUNCTURE: CPT

## 2020-02-17 PROCEDURE — 86780 TREPONEMA PALLIDUM: CPT

## 2020-02-17 PROCEDURE — 2580000003 HC RX 258: Performed by: STUDENT IN AN ORGANIZED HEALTH CARE EDUCATION/TRAINING PROGRAM

## 2020-02-17 PROCEDURE — 3E033VJ INTRODUCTION OF OTHER HORMONE INTO PERIPHERAL VEIN, PERCUTANEOUS APPROACH: ICD-10-PCS | Performed by: OBSTETRICS & GYNECOLOGY

## 2020-02-17 PROCEDURE — 86901 BLOOD TYPING SEROLOGIC RH(D): CPT

## 2020-02-17 PROCEDURE — 1220000000 HC SEMI PRIVATE OB R&B

## 2020-02-17 PROCEDURE — 83036 HEMOGLOBIN GLYCOSYLATED A1C: CPT

## 2020-02-17 PROCEDURE — 86900 BLOOD TYPING SEROLOGIC ABO: CPT

## 2020-02-17 PROCEDURE — 85025 COMPLETE CBC W/AUTO DIFF WBC: CPT

## 2020-02-17 PROCEDURE — 80307 DRUG TEST PRSMV CHEM ANLYZR: CPT

## 2020-02-17 RX ORDER — ONDANSETRON 2 MG/ML
4 INJECTION INTRAMUSCULAR; INTRAVENOUS EVERY 6 HOURS PRN
Status: DISCONTINUED | OUTPATIENT
Start: 2020-02-17 | End: 2020-02-18

## 2020-02-17 RX ORDER — VALACYCLOVIR HYDROCHLORIDE 500 MG/1
500 TABLET, FILM COATED ORAL 2 TIMES DAILY
Status: DISCONTINUED | OUTPATIENT
Start: 2020-02-17 | End: 2020-02-17 | Stop reason: CLARIF

## 2020-02-17 RX ORDER — ALBUTEROL SULFATE 90 UG/1
2 AEROSOL, METERED RESPIRATORY (INHALATION) EVERY 6 HOURS PRN
Status: DISCONTINUED | OUTPATIENT
Start: 2020-02-17 | End: 2020-02-19 | Stop reason: HOSPADM

## 2020-02-17 RX ORDER — CALCIUM CARBONATE 200(500)MG
1000 TABLET,CHEWABLE ORAL 2 TIMES DAILY PRN
Status: DISCONTINUED | OUTPATIENT
Start: 2020-02-17 | End: 2020-02-19 | Stop reason: HOSPADM

## 2020-02-17 RX ORDER — PANTOPRAZOLE SODIUM 40 MG/1
40 TABLET, DELAYED RELEASE ORAL NIGHTLY
Status: DISCONTINUED | OUTPATIENT
Start: 2020-02-17 | End: 2020-02-19 | Stop reason: HOSPADM

## 2020-02-17 RX ORDER — DIPHENHYDRAMINE HCL 25 MG
25 TABLET ORAL EVERY 4 HOURS PRN
Status: DISCONTINUED | OUTPATIENT
Start: 2020-02-17 | End: 2020-02-18

## 2020-02-17 RX ORDER — SODIUM CHLORIDE 0.9 % (FLUSH) 0.9 %
10 SYRINGE (ML) INJECTION PRN
Status: DISCONTINUED | OUTPATIENT
Start: 2020-02-17 | End: 2020-02-18

## 2020-02-17 RX ORDER — ACYCLOVIR 200 MG/1
400 CAPSULE ORAL 3 TIMES DAILY
Status: DISCONTINUED | OUTPATIENT
Start: 2020-02-17 | End: 2020-02-19

## 2020-02-17 RX ORDER — SODIUM CHLORIDE 0.9 % (FLUSH) 0.9 %
10 SYRINGE (ML) INJECTION EVERY 12 HOURS SCHEDULED
Status: DISCONTINUED | OUTPATIENT
Start: 2020-02-17 | End: 2020-02-18

## 2020-02-17 RX ORDER — ACETAMINOPHEN 500 MG
1000 TABLET ORAL EVERY 6 HOURS PRN
Status: DISCONTINUED | OUTPATIENT
Start: 2020-02-17 | End: 2020-02-18

## 2020-02-17 RX ORDER — FLUTICASONE PROPIONATE 110 UG/1
2 AEROSOL, METERED RESPIRATORY (INHALATION) 2 TIMES DAILY
Status: DISCONTINUED | OUTPATIENT
Start: 2020-02-17 | End: 2020-02-19 | Stop reason: HOSPADM

## 2020-02-17 RX ORDER — SODIUM CHLORIDE, SODIUM LACTATE, POTASSIUM CHLORIDE, CALCIUM CHLORIDE 600; 310; 30; 20 MG/100ML; MG/100ML; MG/100ML; MG/100ML
INJECTION, SOLUTION INTRAVENOUS CONTINUOUS
Status: DISCONTINUED | OUTPATIENT
Start: 2020-02-17 | End: 2020-02-18

## 2020-02-17 RX ADMIN — DEXTROSE MONOHYDRATE 5 MILLION UNITS: 5 INJECTION INTRAVENOUS at 19:08

## 2020-02-17 RX ADMIN — ACYCLOVIR 400 MG: 200 CAPSULE ORAL at 23:50

## 2020-02-17 RX ADMIN — SODIUM CHLORIDE, POTASSIUM CHLORIDE, SODIUM LACTATE AND CALCIUM CHLORIDE: 600; 310; 30; 20 INJECTION, SOLUTION INTRAVENOUS at 22:58

## 2020-02-17 RX ADMIN — PENICILLIN G POTASSIUM 2.5 MILLION UNITS: 5000000 INJECTION, POWDER, FOR SOLUTION INTRAMUSCULAR; INTRAVENOUS at 23:51

## 2020-02-17 RX ADMIN — PANTOPRAZOLE SODIUM 40 MG: 40 TABLET, DELAYED RELEASE ORAL at 20:01

## 2020-02-17 RX ADMIN — FLUTICASONE PROPIONATE 2 PUFF: 110 AEROSOL, METERED RESPIRATORY (INHALATION) at 20:02

## 2020-02-17 RX ADMIN — SODIUM CHLORIDE, POTASSIUM CHLORIDE, SODIUM LACTATE AND CALCIUM CHLORIDE: 600; 310; 30; 20 INJECTION, SOLUTION INTRAVENOUS at 18:44

## 2020-02-17 RX ADMIN — DINOPROSTONE 10 MG: 10 INSERT VAGINAL at 19:54

## 2020-02-17 NOTE — DISCHARGE SUMMARY
Obstetric Discharge Summary  [facility]    Patient Name: Brigette Baer  Patient : 1995  Primary Care Physician: Alfa Leach MD  Admit Date: 2020    Principal Diagnosis: IUP at 39w1d, admitted for Elective induction of labor     Her pregnancy has been complicated by:   Patient Active Problem List   Diagnosis    ADHD    GERD    Family history of breast cancer in first degree relative    Herpes simplex vulvovaginitis II    HPV (human papilloma virus) infection    Mild intermittent asthma    Anxiety    Major Depressive Disorder    Chronic pain of right knee    Hx of Helicobacter gastritis    BMI 35.0 to 35.9     Family history of spina bifida (brother)    Right ovarian cyst    IBS with constipation/diarrhea    Constipation    Acute midline low back pain without sciatica    UTI in pregnancy    Abnormal glucose tolerance test (GTT) during pregnancy, antepartum    Asthma affecting pregnancy, antepartum    Family history of diabetes mellitus    Headache    39 weeks gestation of pregnancy    GBS bacteriuria     F 2020 F APG 8/9 Wt 7#6    Postpartum state       Infection Present?: No  Hospital Acquired: No    Surgical Operations & Procedures:  [x] Pitocin Induction of Labor  [] Pitocin Augmentation of Labor  [x] Prostaglandin Induction of Labor  [] Mechanical Induction of Labor  [] Artificial Rupture of Membranes  [x] Intrauterine Pressure Catheter  [] Fetal Scalp Electrode  [] Amnioinfusion  Analgesia: none  Delivery Type: Spontaneous Vaginal Delivery: See Labor and Delivery Summary   Laceration(s): First degree laceration. Suture used for repair:  Vicryl 3.0. Consultations: None    Pertinent Findings & Procedures:   Brigette Baer is a 21 y.o. female  at 39w1d admitted for Elective induction of labor ; received PenG for GBS prophylaxis, cervidil x1, SROM, clr, pitocin, IUPC, Nubain.      She delivered by induced vaginal a Live Born infant on

## 2020-02-17 NOTE — H&P
OBSTETRICAL HISTORY [address]    Date: 2020       Time: 6:20 PM   Patient Name: Russel Patino     Patient : 1995  Room/Bed: [facility]/1856-88    Admission Date/Time: 2020  6:12 PM      CC: Elective induction of labor     HPI: Russel aPtino is a 21 y.o. Veria Iam at 36w3d who presents elective induction of labor. The patient reports fetal movement is present, denies contractions, denies loss of fluid, denies vaginal bleeding. Patient denies headache, vision changes, nausea, vomiting, fever, chills, shortness of breath, chest pain, RUQ pain, abdominal pain, diarrhea, change in color/amount/odor of vaginal discharge, dysuria or, hematuria. Pt reports hx of genital HSV where she typically gets active lesions in the summer time. She reports last active genital lesion in the fall. She has been taking valtrex 500mg BID for prophylaxis since 36 weeks GA. She denies any active lesions currently or any prodromal s/s. Pregnancy is complicated by Elevated 1hr GTT, no 3hr GTT, Genital HSV, Asthma, Hx of H. Pylori gastriti/GERD, IBS, FHx Spina bifida (pt's brother), ADHD/Anxiety/Depression, Hx of headaches, Obesity    DATING:  LMP: Patient's last menstrual period was 2019 (exact date).   Estimated Date of Delivery: 20   Based on: 13w4d ultrasound    PREGNANCY RISK FACTORS:  Patient Active Problem List   Diagnosis    ADHD    GERD    Family history of breast cancer in first degree relative    Herpes simplex vulvovaginitis II    HPV (human papilloma virus) infection    Mild intermittent asthma    Anxiety    Major Depressive Disorder    Chronic pain of right knee    Hx of Helicobacter gastritis    BMI 35.0 to 35.9     Family history of spina bifida (brother)    Right ovarian cyst    IBS with constipation/diarrhea    Constipation    Acute midline low back pain without sciatica    Primigravida, antepartum    UTI in pregnancy    Abnormal glucose tolerance test (GTT) during pregnancy, antepartum    Asthma affecting pregnancy, antepartum    Family history of diabetes mellitus    32 weeks gestation of pregnancy    Depression    Headache    39 weeks gestation of pregnancy        Steroids Given In This Pregnancy:  no     REVIEW OF SYSTEMS:   Constitutional: negative fever, negative chills, negative weight changes   HEENT: negative visual disturbances, negative headaches, negative dizziness  Breast: negative breast abnormalities, negative breast lumps, negative nipple discharge  Respiratory: negative dyspnea, negative cough, negative SOB  Cardiovascular: negative chest pain,  negative palpitations, negative arrhythmia, negative syncope   Gastrointestinal: negative abdominal pain, negative RUQ pain, negative N/V, negative diarrhea, negative constipation, negative bowel changes  Genitourinary: negative dysuria, negative hematuria, negative urinary incontinence, negative vaginal discharge  Dermatological: negative rash, negative pruritis, negative mole or other skin changes  Hematologic: negative bruising, negative personal/family history of DVT/PE  Immunologic/Lymphatic: negative recent illness, negative recent sick contact  Musculoskeletal: negative back pain, negative myalgias, negative arthralgias  Neurological:  negative dizziness, negative migraines, negative seizures, negative weakness  Behavior/Psych: negative depression, negative anxiety, negative SI, negative HI    OBSTETRICAL HISTORY:   OB History    Para Term  AB Living   1 0 0 0 0 0   SAB TAB Ectopic Molar Multiple Live Births   0 0 0 0 0 0      # Outcome Date GA Lbr Narayan/2nd Weight Sex Delivery Anes PTL Lv   1 Current                PAST MEDICAL HISTORY:   has a past medical history of Acne vulgaris, Acute midline low back pain without sciatica, ADHD (attention deficit hyperactivity disorder), Anxiety, Asthma, Breast tenderness, Class 2 obesity due to excess calories without serious comorbidity with body mass index (BMI) of 35.0 to 35.9 in adult, Depression, DUB (dysfunctional uterine bleeding), Family history of breast cancer, GERD (gastroesophageal reflux disease), Headache(784.0), Herpes labialis, IBS with constipation/diarrhea, Menometrorrhagia, Mild intermittent asthma without complication, Underweight, and Vision abnormalities. PAST SURGICAL HISTORY:   has a past surgical history that includes Tonsillectomy and Upper gastrointestinal endoscopy (8/8/12). ALLERGIES:  is allergic to bee venom. MEDICATIONS:  Prior to Admission medications    Medication Sig Start Date End Date Taking?  Authorizing Provider   omeprazole (PRILOSEC) 20 MG delayed release capsule take 1 capsule by mouth once daily 1/13/20   Forest Tellez MD   ferrous sulfate 325 (65 Fe) MG tablet Take 1 tablet by mouth daily (with breakfast)  Patient not taking: Reported on 2/12/2020 12/5/19   ADAMA Keith - NP   Prenatal Multivit-Min-Fe-FA (PRENATAL VITAMINS) 0.8 MG TABS Take 1 tablet by mouth daily 11/19/19   Corie Sears APRN - CNP   polyethylene glycol (MIRALAX) powder Take 17 g by mouth daily as needed (constipation)  Patient not taking: Reported on 2/12/2020 11/11/19   Forest Tellez MD   aspirin EC 81 MG EC tablet Take 1 tablet by mouth daily 10/29/19   Fabricio Franks DO   albuterol sulfate  (90 Base) MCG/ACT inhaler Inhale 2 puffs into the lungs 4 times daily as needed for Wheezing  Patient not taking: Reported on 2/12/2020 2/20/19   Forest Tellez MD   VENTOLIN  (90 Base) MCG/ACT inhaler inhale 2 puffs by mouth every 6 hours if needed for wheezing  Patient not taking: Reported on 2/12/2020 1/28/19   Forest Tellez MD   RA ANTACID/ANTI--800-50 MG/5ML SUSP suspension take 5 milliliters by mouth every 6 hours if needed for indigestion  Patient not taking: Reported on 2/12/2020 1/28/19   Forest Tellez MD   docusate sodium (COLACE) 100 MG capsule Take 1 capsule by mouth 2 times daily as needed for Constipation  Patient not taking: Reported on 2/12/2020 12/5/18   Juan C Gautam MD   Elastic Bandages & Supports (ACE KNEE BRACE HINGED) MISC Use daily to during knee pain  Patient not taking: Reported on 2/12/2020 6/7/18   Juan C Gautam MD   FLOVENT  MCG/ACT inhaler inhale 1 puff by mouth twice a day  Patient not taking: Reported on 2/12/2020 3/26/18   Juan C Gautam MD       FAMILY HISTORY:  family history includes Asthma in her brother; Breast Cancer in her paternal aunt; Cancer in her paternal grandfather and paternal grandmother; Depression in her father and mother; Diabetes in her maternal grandmother, paternal aunt, and paternal grandmother; Peter Flatness in her father; Osteoporosis in her father; Other in her brother; Ovarian Cancer in her paternal aunt; Thyroid Disease in her paternal aunt. SOCIAL HISTORY:   reports that she quit smoking about 7 years ago. She has never used smokeless tobacco. She reports that she does not drink alcohol or use drugs.     VITALS:  Vitals:    02/17/20 1827 02/17/20 1850   BP: 114/74    Pulse: 92    Resp: 14    Temp: 97.9 °F (36.6 °C)    TempSrc: Infrared    Weight:  178 lb (80.7 kg)   Height:  4' 11\" (1.499 m)         PHYSICAL EXAM:  Fetal Heart Monitor:  Baseline Heart Rate 140, moderate variability, present accelerations, absent decelerations  [place]: rare contractions    General appearance:  no apparent distress, alert and cooperative  HEENT: head atraumatic, normocephalic, moist mucous membranes, trachea midline  Neurologic:  alert, oriented, normal speech, no focal findings or movement disorder noted  Lungs:  No increased work of breathing, good air exchange, clear to auscultation bilaterally, no crackles or wheezing  Heart:  regular rate and rhythm and no murmur, rubs, gallops  Abdomen:  soft, gravid, non-tender, no right upper quadrant tenderness, no CVA tenderness, uterus non-tender, no signs of abruption and no signs of 12/11/2019 Follow up in 2 weeks for BPP and fetal echo- per patient prefernce  Declined invasive testing  Patient opted for non-invasive testing        Right ovarian cyst 10/27/2019     12/11/2019 1.37x1.32x1.38      BMI 35.0 to 35.9  37/81/9089    Hx of Helicobacter gastritis 01/30/2019    Chronic pain of right knee 06/05/2018    Mild intermittent asthma 05/31/2017    Anxiety 05/31/2017    Major Depressive Disorder 05/31/2017       Plan discussed with Dr. Cole Prieto, who is agreeable. Steroids given this admission: No    Risks, benefits, alternatives and possible complications have been discussed in detail with the patient. Admission, and post admission procedures and expectations were discussed in detail. All questions were answered.     Attending's Name: Dr. Anton Gomez DO  Ob/Gyn Resident  2/17/2020, 6:20 PM

## 2020-02-18 PROBLEM — R82.71 GBS BACTERIURIA: Status: ACTIVE | Noted: 2020-02-18

## 2020-02-18 PROBLEM — Z34.00 PRIMIGRAVIDA, ANTEPARTUM: Status: RESOLVED | Noted: 2019-11-19 | Resolved: 2020-02-18

## 2020-02-18 LAB
CULTURE: NORMAL
ESTIMATED AVERAGE GLUCOSE: 103 MG/DL
HBA1C MFR BLD: 5.2 % (ref 4–6)
Lab: NORMAL
SPECIMEN DESCRIPTION: NORMAL

## 2020-02-18 PROCEDURE — 59050 FETAL MONITOR W/REPORT: CPT

## 2020-02-18 PROCEDURE — 6370000000 HC RX 637 (ALT 250 FOR IP): Performed by: STUDENT IN AN ORGANIZED HEALTH CARE EDUCATION/TRAINING PROGRAM

## 2020-02-18 PROCEDURE — 99024 POSTOP FOLLOW-UP VISIT: CPT | Performed by: OBSTETRICS & GYNECOLOGY

## 2020-02-18 PROCEDURE — 2580000003 HC RX 258: Performed by: STUDENT IN AN ORGANIZED HEALTH CARE EDUCATION/TRAINING PROGRAM

## 2020-02-18 PROCEDURE — 88307 TISSUE EXAM BY PATHOLOGIST: CPT

## 2020-02-18 PROCEDURE — 6360000002 HC RX W HCPCS: Performed by: STUDENT IN AN ORGANIZED HEALTH CARE EDUCATION/TRAINING PROGRAM

## 2020-02-18 PROCEDURE — 1220000000 HC SEMI PRIVATE OB R&B

## 2020-02-18 PROCEDURE — 0KQM0ZZ REPAIR PERINEUM MUSCLE, OPEN APPROACH: ICD-10-PCS | Performed by: OBSTETRICS & GYNECOLOGY

## 2020-02-18 PROCEDURE — 7200000001 HC VAGINAL DELIVERY

## 2020-02-18 PROCEDURE — 10H073Z INSERTION OF MONITORING ELECTRODE INTO PRODUCTS OF CONCEPTION, VIA NATURAL OR ARTIFICIAL OPENING: ICD-10-PCS | Performed by: OBSTETRICS & GYNECOLOGY

## 2020-02-18 PROCEDURE — 2500000003 HC RX 250 WO HCPCS: Performed by: STUDENT IN AN ORGANIZED HEALTH CARE EDUCATION/TRAINING PROGRAM

## 2020-02-18 RX ORDER — LIDOCAINE HYDROCHLORIDE 10 MG/ML
30 INJECTION, SOLUTION INFILTRATION; PERINEURAL ONCE
Status: COMPLETED | OUTPATIENT
Start: 2020-02-18 | End: 2020-02-18

## 2020-02-18 RX ORDER — SODIUM CHLORIDE 0.9 % (FLUSH) 0.9 %
10 SYRINGE (ML) INJECTION PRN
Status: DISCONTINUED | OUTPATIENT
Start: 2020-02-18 | End: 2020-02-19 | Stop reason: HOSPADM

## 2020-02-18 RX ORDER — ACETAMINOPHEN 500 MG
1000 TABLET ORAL EVERY 6 HOURS PRN
Status: DISCONTINUED | OUTPATIENT
Start: 2020-02-18 | End: 2020-02-19 | Stop reason: HOSPADM

## 2020-02-18 RX ORDER — LANOLIN 100 %
OINTMENT (GRAM) TOPICAL PRN
Status: DISCONTINUED | OUTPATIENT
Start: 2020-02-18 | End: 2020-02-19 | Stop reason: HOSPADM

## 2020-02-18 RX ORDER — ONDANSETRON 4 MG/1
4 TABLET, FILM COATED ORAL EVERY 6 HOURS PRN
Status: DISCONTINUED | OUTPATIENT
Start: 2020-02-18 | End: 2020-02-19 | Stop reason: HOSPADM

## 2020-02-18 RX ORDER — NALBUPHINE HCL 10 MG/ML
10 AMPUL (ML) INJECTION ONCE
Status: COMPLETED | OUTPATIENT
Start: 2020-02-18 | End: 2020-02-18

## 2020-02-18 RX ORDER — SODIUM CHLORIDE 0.9 % (FLUSH) 0.9 %
10 SYRINGE (ML) INJECTION 2 TIMES DAILY
Status: DISCONTINUED | OUTPATIENT
Start: 2020-02-18 | End: 2020-02-19 | Stop reason: HOSPADM

## 2020-02-18 RX ORDER — NAPROXEN 500 MG/1
500 TABLET ORAL EVERY 12 HOURS
Status: DISCONTINUED | OUTPATIENT
Start: 2020-02-18 | End: 2020-02-19 | Stop reason: HOSPADM

## 2020-02-18 RX ORDER — DOCUSATE SODIUM 100 MG/1
100 CAPSULE, LIQUID FILLED ORAL 2 TIMES DAILY
Status: DISCONTINUED | OUTPATIENT
Start: 2020-02-18 | End: 2020-02-19 | Stop reason: HOSPADM

## 2020-02-18 RX ADMIN — NAPROXEN 500 MG: 500 TABLET ORAL at 14:54

## 2020-02-18 RX ADMIN — ONDANSETRON 4 MG: 2 INJECTION INTRAMUSCULAR; INTRAVENOUS at 10:14

## 2020-02-18 RX ADMIN — NALBUPHINE HYDROCHLORIDE 10 MG: 10 INJECTION, SOLUTION INTRAMUSCULAR; INTRAVENOUS; SUBCUTANEOUS at 12:03

## 2020-02-18 RX ADMIN — LIDOCAINE HYDROCHLORIDE 20 ML: 10 INJECTION, SOLUTION INFILTRATION; PERINEURAL at 14:10

## 2020-02-18 RX ADMIN — BENZOCAINE: 100 GEL TOPICAL at 09:41

## 2020-02-18 RX ADMIN — ANTACID TABLETS 1000 MG: 500 TABLET, CHEWABLE ORAL at 01:35

## 2020-02-18 RX ADMIN — ACYCLOVIR 400 MG: 200 CAPSULE ORAL at 08:06

## 2020-02-18 RX ADMIN — ACYCLOVIR 400 MG: 200 CAPSULE ORAL at 22:01

## 2020-02-18 RX ADMIN — PENICILLIN G POTASSIUM 2.5 MILLION UNITS: 5000000 INJECTION, POWDER, FOR SOLUTION INTRAMUSCULAR; INTRAVENOUS at 08:06

## 2020-02-18 RX ADMIN — PENICILLIN G POTASSIUM 2.5 MILLION UNITS: 5000000 INJECTION, POWDER, FOR SOLUTION INTRAMUSCULAR; INTRAVENOUS at 04:06

## 2020-02-18 RX ADMIN — FLUTICASONE PROPIONATE 2 PUFF: 110 AEROSOL, METERED RESPIRATORY (INHALATION) at 19:14

## 2020-02-18 RX ADMIN — Medication 10 ML: at 16:20

## 2020-02-18 RX ADMIN — Medication 1 MILLI-UNITS/MIN: at 10:10

## 2020-02-18 RX ADMIN — Medication 500 MILLI-UNITS/MIN: at 14:07

## 2020-02-18 RX ADMIN — FLUTICASONE PROPIONATE 2 PUFF: 110 AEROSOL, METERED RESPIRATORY (INHALATION) at 08:10

## 2020-02-18 RX ADMIN — DOCUSATE SODIUM 100 MG: 100 CAPSULE, LIQUID FILLED ORAL at 22:01

## 2020-02-18 RX ADMIN — ACETAMINOPHEN 1000 MG: 500 TABLET, FILM COATED ORAL at 07:04

## 2020-02-18 RX ADMIN — PENICILLIN G POTASSIUM 2.5 MILLION UNITS: 5000000 INJECTION, POWDER, FOR SOLUTION INTRAMUSCULAR; INTRAVENOUS at 12:26

## 2020-02-18 RX ADMIN — PANTOPRAZOLE SODIUM 40 MG: 40 TABLET, DELAYED RELEASE ORAL at 22:01

## 2020-02-18 RX ADMIN — BENZOCAINE AND LEVOMENTHOL: 200; 5 SPRAY TOPICAL at 14:54

## 2020-02-18 RX ADMIN — SODIUM CHLORIDE, POTASSIUM CHLORIDE, SODIUM LACTATE AND CALCIUM CHLORIDE: 600; 310; 30; 20 INJECTION, SOLUTION INTRAVENOUS at 08:56

## 2020-02-18 ASSESSMENT — PAIN SCALES - GENERAL
PAINLEVEL_OUTOF10: 4
PAINLEVEL_OUTOF10: 2
PAINLEVEL_OUTOF10: 10
PAINLEVEL_OUTOF10: 3
PAINLEVEL_OUTOF10: 0
PAINLEVEL_OUTOF10: 4
PAINLEVEL_OUTOF10: 1
PAINLEVEL_OUTOF10: 5

## 2020-02-18 ASSESSMENT — PAIN DESCRIPTION - ORIENTATION: ORIENTATION: LOWER;MID

## 2020-02-18 ASSESSMENT — PAIN - FUNCTIONAL ASSESSMENT: PAIN_FUNCTIONAL_ASSESSMENT: ACTIVITIES ARE NOT PREVENTED

## 2020-02-18 ASSESSMENT — PAIN DESCRIPTION - LOCATION: LOCATION: ABDOMEN

## 2020-02-18 ASSESSMENT — PAIN DESCRIPTION - DESCRIPTORS
DESCRIPTORS: DISCOMFORT
DESCRIPTORS: DISCOMFORT
DESCRIPTORS: CRAMPING

## 2020-02-18 ASSESSMENT — PAIN DESCRIPTION - FREQUENCY: FREQUENCY: INTERMITTENT

## 2020-02-18 ASSESSMENT — PAIN DESCRIPTION - ONSET: ONSET: ON-GOING

## 2020-02-18 ASSESSMENT — PAIN DESCRIPTION - PAIN TYPE: TYPE: ACUTE PAIN

## 2020-02-18 ASSESSMENT — PAIN DESCRIPTION - PROGRESSION: CLINICAL_PROGRESSION: GRADUALLY WORSENING

## 2020-02-18 NOTE — PROGRESS NOTES
Labor Progress Note    Jasmyne Morgan is a 21 y.o. female  at 44w2d  The patient was seen and examined. Her pain is well controlled. She reports fetal movement is present, denies contractions, denies loss of fluid, denies vaginal bleeding. Vital Signs:  Vitals:    20 1827 20 1850 20 0130 20 0410   BP: 114/74  126/70 112/69   Pulse: 92  79 81   Resp: 14  14 12   Temp: 97.9 °F (36.6 °C)  98.2 °F (36.8 °C) 97.9 °F (36.6 °C)   TempSrc: Infrared  Infrared Infrared   Weight:  178 lb (80.7 kg)     Height:  4' 11\" (1.499 m)           FHT: 130, moderate variability, accelerations present, decelerations absent  Contractions: regular, every 2-3 minutes  [place] Units: not able to be calculated  Cervical Exam: 2 dilated, 70% effaced, -1 station (out of 3 station)  Pitocin: @ 0 mu/min    Membranes: Intact  Scalp Electrode in place: absent  Intrauterine Pressure Catheter in Place: absent    Interventions: none    Assessment/Plan:  Jasmyne Morgan is a 21 y.o. female  at 44w2d here for  eIOL              - GBS bacteruria, Pen G for GBS prophylaxis              - Cat I FHT, TOCO q 2-3min              - MVUs: not able to be calculated              - SVE 2/70/-1 (out of 3 station)              - VSS              - Cervidil in place, due out @ 0854     Elevated 1hr GTT, no 3hr GTT              - HgbA1c pending     Genital HSV              - Continue acyclovir 400mg TID     Asthma              - Continue albuterol PRN and flovent inhaler BID     Hx of H. Pylori gastritis/ GERD/ IBS              - Continue prilosec 20mg hs    Dr. Deborah Kenney updated and in agreement with plan.     Eduardo Tompkins DO  Ob/Gyn Resident  2020, 7:07 AM

## 2020-02-18 NOTE — PROGRESS NOTES
Labor Progress Note    Rosalinda Stewart is a 21 y.o. female  at 44w2d  The patient was seen and examined. Her pain is not well controlled. She reports fetal movement is present, complains of contractions, denies loss of fluid, denies vaginal bleeding. She is requesting Nubain. IUPC was placed without difficulty. Patient tolerated well.      Vital Signs:  Vitals:    20 1010 20 1040 20 1110 20 1141   BP: 114/73 118/66 103/66 110/66   Pulse: 74 75 68 72   Resp: 16 16 16 16   Temp:       TempSrc:       Weight:       Height:           FHT: 130, moderate variability, accelerations present, decelerations absent  Contractions: regular, every 2 to 3 minutes  Cervical Exam: 3 cm dilated, 80 effaced, -1 out of 3 station  Pitocin: @ 0 mu/min    Membranes: ruptured clear fluid   Scalp Electrode in place: absent  Intrauterine Pressure Catheter in Place: present     Interventions: IUPC placed     Assessment/Plan:  Rosalinda Stewart is a 21 y.o. female  at 44w2d admitted for eIOL   - GBS positive, Pen G for GBS prophylaxis   - cEFM and IUPC: cat 1 tracing with regular contractions    - IVF    - S/p cervidil    - SROM, clr   - IUPC   - Continue pitocin    - Nubain ordered    - Expectant management       Attending updated and in agreement with plan    Madalyn Sanchez DO  Ob/Gyn Resident  2020, 11:59 AM

## 2020-02-18 NOTE — PROGRESS NOTES
Obstetric/Gynecology Resident Interval Note    FHT with 130bpm baseline, moderate variability, positive accelerations, one 3-4 prolonged deceleration noted at 0016 down to 115bpm with spontaneous return to baseline, overall Cat I FHT  TOCO: q 2-5minutes    Pt resting comfortably. Overall Cat I FHT and will continue current management plan.     Ashley Schmitt DO  OBGYN Resident, PGY2  [facility]  2/18/2020, 12:46 AM

## 2020-02-18 NOTE — L&D DELIVERY NOTE
of Obstetrics and Gynecology  Spontaneous Vaginal Delivery Note        Patient Name: Dami Phillips  Patient : 1995  Room/Bed: 8279/2725-43  Admission Date/Time: 2020  6:12 PM  MRN #: [id number]  [facility] #: [id number]          Date: 2020  Time: 2:19 PM    Pre-operative Diagnosis:   Dami Phillips is a 21 y.o. female at [place] [address] pregnancy, Induced labor, Single fetus and Pregnancy complicated by: SEE PROBLEM LIST  Patient Active Problem List    Diagnosis Date Noted    Abnormal glucose tolerance test (GTT) during pregnancy, antepartum 2019     Priority: High     Overview Note:     2019 3 hour GTT and Hgb A1c ordered        Asthma affecting pregnancy, antepartum 2019     Priority: High    Family history of diabetes mellitus 2019     Priority: High    UTI in pregnancy 2019     Priority: High     Overview Note:     2019 treated with Keflex- ER      Herpes simplex vulvovaginitis II 2016     Priority: High     Overview Note:      if outbreak at term  Valtrex at 36 weeks   STD counseling and barrier RECS      ADHD 2011     Priority: High     Class: Chronic    Right ovarian cyst 10/27/2019     Priority: Medium     Overview Note:     2019 1.37x1.32x1.38      HPV (human papilloma virus) infection 2016     Priority: Medium     Overview Note:     Counseling on Laryngeal Papillomatosis:     Laryngeal papillomatosis, also known as recurrent respiratory papillomatosis or glottal papillomatosis, is a rare medical condition (2 per 100,000 adults and 4.5 per 100,000 children)[1]:411, caused by a HPV infection of the throat.[2]:411 Laryngeal papillomatosis causes assorted tumors or papillomas to develop over a period of time. It usually develops from strains 6 or 11 of the HPV virus. Route of delivery with morbidity rates of both vaginal and  section were reviewed in detail.   The patient has elected to proceed with a vaginal None  Induction:  Cervidil  Indications for induction:  Elective  Augmentation:  Oxytocin  Indications for augmentation:  Ineffective Contraction Pattern  Labor complications:  None          Labor Event Times    Labor onset date/time:     Dilation complete date/time:   20   Start pushin2020   Decision time (emergent ): Anesthesia    Method:  Local  Analgesics:  LIDOCAINE HCL 1 % IJ SOLN     Assisted Delivery Details    Forceps attempted?:  No  Vacuum extractor attempted?:  No                     Document Additional Attempt       Document Additional Attempt                             Shoulder Dystocia    Shoulder dystocia present?:  No                  Add Second Maneuver          Add Third Maneuver          Add Fourth Maneuver          Add Fifth Maneuver          Add Sixth Maneuver          Add Seventh Maneuver          Add Eighth Maneuver          Add Ninth Maneuver             [place] Presentation    Presentation:  Vertex  Position:  Left  _:  Occiput  _:  Anterior     [place] Information    Head delivery date/time:  2020 14:03:00   Changing the 's delivery date/time could affect patient care.:     Delivery date/time:   20   Delivery type:  Vaginal, Spontaneous    Details:         Delivery Providers    Delivering clinician:  Carla Cox,    Provider Role    Carla Cox, DO Obstetrician    Romario Kang,  Resident    Jasmyn Giron, RN Certified Registered Nurse    Loy Sheets, RN Certified Registered Nurse    Jorje Cordova OB Tech      Cord    Vessels:  3 Vessels  Complications:  None  Delayed cord clamping?:  Yes  Cord clamped date/time:  2020 140  Cord blood disposition:  Refrigerator  Gases sent?:  Yes  Stem cell collection (by provider):   No     Placenta    Date/time:  2020 14:07:00  Removal:  Spontaneous  Appearance:  Intact  Disposition:  Pathology     Delivery Resuscitation    Method:  Bulb Suction, Stimulation     Apgars    Living status:  Living  Apgars   1 Minute:   5 Minute:   10 Minute 15 Minute 20 Minute   Skin Color: 0  1       Heart Rate: 2  2       Reflex Irritability: 2  2       Muscle Tone: 2  2       Respiratory Effort: 2  2       Total: 8  9               Apgars Assigned By:  Atul Frances     Skin to Skin    Skin to skin initiation date/time: 20 14:03:00   Skin to skin with: Mother  Skin to skin end date/time:         Measurements           Delivery Information    Episiotomy:  None  Perineal lacerations:  2nd Repaired:  Yes   Periurethral laceration:  right Repaired:  No   Vaginal laceration:  No    Cervical laceration:  No    Vaginal delivery est. blood loss (mL):  200  Surgical or additional est. blood loss (mL):  0 (View Only):  Edit in Flowsheets   Combined est. blood loss (mL):  200     Vaginal Delivery Counts    Initial count personnel:  GERARD  Initial count verified by:  ELINOR   4x4:   Needles:   Instruments:   Lap Pads:   Sponges:     Initial counts:          Final counts:          Final count personnel:  Surendra Garcia  Final count verified by:  Jalen Flowers  Accurate final count?:  Yes  Final vaginal sweep completed: Yes            Living  infant(s) and Female    Cephalic  left occiput anterior  Other:       Amniotic Fluid was: Clear  A Nuchal Cord: was not present x 0  A Spontaneous Cry Was Noted: Yes  The Baby: was suctioned        The Placenta Was Removed:  intact, whole and that the umbilical cord had three vessels noted    cord gasses were obtained and sent to the lab, cord blood was obtained and sent to the lab, Pitocin, 20 milliunits in 1 liter of ringers lactate was administered, wide open, to assist with uterine contraction and vaginal lacerations were repaired    The umbilical cord had delayed clamping of 1 minute: Yes      Episiotomy: (none)  Second degree laceration. Suture used for repair:  Vicryl 3.0.     The Cervix Vagina & Rectum were inspected after the repair and found to be intact without any defects. Good sphincter tone was present. Yes      Condition:  infant stable to general nursery and mother stable    Blood Type and Rh:   ABO/Rh   Date Value Ref Range Status   02/17/2020 AB POSITIVE  Final         Rubella Immunity Status:     Rubella Antibody, IGG   Date Value Ref Range Status   01/31/2020 42.8 IU/mL Final     Comment:                 REFERENCE RANGE:  <5.0       NON-REACTIVE (non-immune)  5.0 TO 9.9 EQUIVOCAL  >=10.0     REACTIVE     (immune)                     Infant Feeding:    bottle -          Attending Attestation: I was present and scrubbed for the entire procedure. A Vaginal Vault Sweep Was Completed-All Sponge Counts Were Correct: Yes          Resident Name: OSWALD Boo D.O. PGY3    Attending Name: Carla Cox DO      Electronically signed by Carla Cox DO on 2/18/2020 at 2:19 PM

## 2020-02-18 NOTE — PROGRESS NOTES
Labor Progress Note    Darryl Davis is a 21 y.o. female  at 44w2d  The patient was seen and examined. Her pain is well controlled. She reports fetal movement is present, complains of contractions but very irregularly, denies loss of fluid, denies vaginal bleeding. Pt declining anything for pain control. Vital Signs:  Vitals:    20 1827 20 1850 20 0130   BP: 114/74  126/70   Pulse: 92  79   Resp: 14  14   Temp: 97.9 °F (36.6 °C)  98.2 °F (36.8 °C)   TempSrc: Infrared  Infrared   Weight:  178 lb (80.7 kg)    Height:  4' 11\" (1.499 m)        FHT: 125, moderate variability, accelerations present, decelerations absent  Contractions: regular, every 4-6 minutes  [place] Units: not able to be calculated  Cervical Exam: pt declined  Pitocin: @ 0 mu/min    Membranes: Intact  Scalp Electrode in place: absent  Intrauterine Pressure Catheter in Place: absent    Interventions: none    Assessment/Plan:  Darryl Davis is a 21 y.o. female  at 44w2d here for eIOL   - GBS bacteruria, Pen G for GBS prophylaxis   - Cat I FHT, TOCO q 4-6min   - MVUs: not able to be calculated   - SVE declined   - VSS   - Cervidil in place, due out @ 0854    Elevated 1hr GTT, no 3hr GTT   - HgbA1c, POCT glucose pending    Genital HSV   - Continue acyclovir 400mg TID    Asthma   - Continue albuterol PRN and flovent inhaler BID    Hx of H.  Pylori gastritis/ GERD/ IBS   - Continue prilosec 20mg tish Ramirez DO  Ob/Gyn Resident  2020, 1:45 AM

## 2020-02-18 NOTE — PROGRESS NOTES
/HPF Final    RBC, UA 2020 0 TO 2  /HPF Final    Casts UA 2020 NOT REPORTED  /LPF Final    Crystals UA 2020 NOT REPORTED  None /HPF Final    Epithelial Cells UA 2020 20 TO 50  /HPF Final    Renal Epithelial, Urine 2020 NOT REPORTED  0 /HPF Final    Bacteria, UA 2020 MANY* None Final    Mucus, UA 2020 2+* None Final    Trichomonas, UA 2020 NOT REPORTED  None Final    Amorphous, UA 2020 NOT REPORTED  None Final    Other Observations UA 2020 NOT REPORTED  NOT REQ. Final    Yeast, UA 2020 NOT REPORTED  None Final   Hospital Outpatient Visit on 2020   Component Date Value Ref Range Status    Specimen Description 2020 . VAGINAL SPECIMEN   Final    Special Requests 2020 NOT REPORTED   Final    Culture 2020 NEGATIVE FOR GROUP B STREPTOCOCCI   Final   Hospital Outpatient Visit on 2020   Component Date Value Ref Range Status    Rubella Antibody, IGG 2020 42.8  IU/mL Final    Comment:             REFERENCE RANGE:  <5.0       NON-REACTIVE (non-immune)  5.0 TO 9.9 EQUIVOCAL  >=10.0     REACTIVE     (immune)           ]    /79   Pulse 75   Temp 97.9 °F (36.6 °C) (Infrared)   Resp 14   Ht 4' 11\" (1.499 m)   Wt 178 lb (80.7 kg)   LMP 2019 (Exact Date)   BMI 35.95 kg/m²       Pelvic Exam:  Cervix Check:     DILATION:  3 cm   EFFACEMENT:   80%   STATION:  -1 cm   CONSISTENCY:  soft   POSITION:  anterior    FETAL POSITION: Cephalic    Assessment:  1Calixto Taylor is a 21 y.o. female  39w2d     2.   OB History    Para Term  AB Living   1 0 0 0 0 0   SAB TAB Ectopic Molar Multiple Live Births   0 0 0   0        # Outcome Date GA Lbr Narayan/2nd Weight Sex Delivery Anes PTL Lv   1 Current                  3. 39 weeks gestation of pregnancy [Z3A.39]      4.    Patient Active Problem List    Diagnosis Date Noted    Abnormal glucose tolerance test (GTT) during pregnancy, antepartum 2019     Priority: High     2019 3 hour GTT and Hgb A1c ordered        Asthma affecting pregnancy, antepartum 2019     Priority: High    Family history of diabetes mellitus 2019     Priority: High    Primigravida, antepartum 2019     Priority: High    UTI in pregnancy 2019     Priority: High     2019 treated with Keflex- ER      Herpes simplex vulvovaginitis II 2016     Priority: High      if outbreak at term  Valtrex at 36 weeks   STD counseling and barrier RECS      ADHD 2011     Priority: High     Class: Chronic    Right ovarian cyst 10/27/2019     Priority: Medium     2019 1.37x1.32x1.38      HPV (human papilloma virus) infection 2016     Priority: Medium     Counseling on Laryngeal Papillomatosis:     Laryngeal papillomatosis, also known as recurrent respiratory papillomatosis or glottal papillomatosis, is a rare medical condition (2 per 100,000 adults and 4.5 per 100,000 children)[1]:411, caused by a HPV infection of the throat.[2]:411 Laryngeal papillomatosis causes assorted tumors or papillomas to develop over a period of time. It usually develops from strains 6 or 11 of the HPV virus. Route of delivery with morbidity rates of both vaginal and  section were reviewed in detail. The patient has elected to proceed with a vaginal delivery unless the lesions become obstructive precluding a vaginal delivery. She was counseled on the potential risks to her child both short and long term sequelae.  Family history of breast cancer in first degree relative      Priority: Medium     PGM, Paunt in 45s ? ?      GERD      Priority: Medium    39 weeks gestation of pregnancy 2020    Headache     Acute midline low back pain without sciatica 2019    Constipation 2019    IBS with constipation/diarrhea 10/28/2019    Family history of spina bifida (brother) 10/27/2019     2019 Follow up in 2 weeks for

## 2020-02-18 NOTE — PROGRESS NOTES
Education information given to mother and she verbalizes understanding about the following:  Understanding your baby's  screening tests pamphlet. Hour for [employer]. Patient Safety Education. Infant security including the four band system and the HUGS system. Skin to Skin Contact for You and Your Baby. Benefits of breastfeeding. QR codes for videos online including: Breastfeeding Massage/Hand Express, Breastfeeding Positions, and Breastfeeding latch. Risks of formula given and discussed with mother. What do the experts say about the use of pacifiers/supplementation of a  infant? Safe sleep for your baby (supplied by [place])    Mother encouraged to review pamphlets and watch videos (if able). Mother chooses to bottle feed.

## 2020-02-18 NOTE — PROGRESS NOTES
Labor Progress Note    Brigette Baer is a 21 y.o. female  at 44w2d  The patient was seen and examined. Her pain is well controlled. She reports fetal movement is present, complains of contractions, denies loss of fluid, denies vaginal bleeding. Cervidil removed without difficulty. Patient tolerated well.      Vital Signs:  Vitals:    20 1850 20 0130 20 0410 20 0729   BP:  126/70 112/69 117/79   Pulse:  79 81 75   Resp:  14 12    Temp:  98.2 °F (36.8 °C) 97.9 °F (36.6 °C) 97.9 °F (36.6 °C)   TempSrc:  Infrared Infrared    Weight: 178 lb (80.7 kg)      Height: 4' 11\" (1.499 m)          FHT: 125, moderate variability, accelerations present, decelerations absent  Contractions: regular, every 2 to 3 minutes  Cervical Exam: 2 cm dilated, 70 effaced, -1 out of 3 station  Pitocin: @ 0 mu/min    Membranes: Intact  Scalp Electrode in place: absent  Intrauterine Pressure Catheter in Place: absent    Interventions: cervidil removed     Assessment/Plan:  Brigette Baer is a 21 y.o. female  at 44w2d admitted for eIOL   - GBS positive, Pen G for GBS prophylaxis   - cEFM and TOCO: cat 1 tracing with regular contractions    - IVF    - S/p cervidil    - Plan for rocha and low dose pitocin    - Expectant management       Attending updated and in agreement with plan    Kevin Nolasco DO  Ob/Gyn Resident  2020, 8:30 AM

## 2020-02-18 NOTE — PLAN OF CARE
Problem: Pain:  Goal: Pain level will decrease  Description  Pain level will decrease  2/18/2020 1808 by Chon Powell RN  Outcome: Met This Shift  2/18/2020 0422 by Cristal Duvall RN  Outcome: Ongoing  Goal: Control of acute pain  Description  Control of acute pain  2/18/2020 1808 by Chon Powell RN  Outcome: Met This Shift  2/18/2020 0422 by Cristal Duvall RN  Outcome: Ongoing     Problem: Constipation:  Goal: Bowel elimination is within specified parameters  Description  Bowel elimination is within specified parameters  Outcome: Met This Shift     Problem: Fluid Volume - Imbalance:  Goal: Absence of imbalanced fluid volume signs and symptoms  Description  Absence of imbalanced fluid volume signs and symptoms  Outcome: Met This Shift  Goal: Absence of postpartum hemorrhage signs and symptoms  Description  Absence of postpartum hemorrhage signs and symptoms  Outcome: Met This Shift     Problem: Infection - Risk of, Puerperal Infection:  Goal: Will show no infection signs and symptoms  Description  Will show no infection signs and symptoms  Outcome: Met This Shift     Problem: Mood - Altered:  Goal: Mood stable  Description  Mood stable  Outcome: Met This Shift

## 2020-02-19 VITALS
DIASTOLIC BLOOD PRESSURE: 69 MMHG | HEART RATE: 79 BPM | TEMPERATURE: 98.1 F | BODY MASS INDEX: 35.88 KG/M2 | SYSTOLIC BLOOD PRESSURE: 110 MMHG | WEIGHT: 178 LBS | HEIGHT: 59 IN | RESPIRATION RATE: 14 BRPM

## 2020-02-19 LAB
HCT VFR BLD CALC: 28.4 % (ref 36–46)
HEMOGLOBIN: 9.3 G/DL (ref 12–16)

## 2020-02-19 PROCEDURE — 6370000000 HC RX 637 (ALT 250 FOR IP): Performed by: STUDENT IN AN ORGANIZED HEALTH CARE EDUCATION/TRAINING PROGRAM

## 2020-02-19 PROCEDURE — 2580000003 HC RX 258: Performed by: STUDENT IN AN ORGANIZED HEALTH CARE EDUCATION/TRAINING PROGRAM

## 2020-02-19 PROCEDURE — 85014 HEMATOCRIT: CPT

## 2020-02-19 PROCEDURE — 85018 HEMOGLOBIN: CPT

## 2020-02-19 PROCEDURE — 99024 POSTOP FOLLOW-UP VISIT: CPT | Performed by: OBSTETRICS & GYNECOLOGY

## 2020-02-19 PROCEDURE — 36415 COLL VENOUS BLD VENIPUNCTURE: CPT

## 2020-02-19 RX ORDER — PSEUDOEPHEDRINE HCL 30 MG
100 TABLET ORAL 2 TIMES DAILY
Qty: 60 CAPSULE | Refills: 0 | Status: SHIPPED | OUTPATIENT
Start: 2020-02-19 | End: 2021-11-15

## 2020-02-19 RX ORDER — NAPROXEN 500 MG/1
500 TABLET ORAL EVERY 12 HOURS
Qty: 60 TABLET | Refills: 1 | Status: SHIPPED | OUTPATIENT
Start: 2020-02-19 | End: 2021-11-15

## 2020-02-19 RX ORDER — FERROUS SULFATE 325(65) MG
325 TABLET ORAL
Qty: 60 TABLET | Refills: 2 | Status: SHIPPED | OUTPATIENT
Start: 2020-02-19 | End: 2021-08-12 | Stop reason: ALTCHOICE

## 2020-02-19 RX ADMIN — FLUTICASONE PROPIONATE 2 PUFF: 110 AEROSOL, METERED RESPIRATORY (INHALATION) at 09:16

## 2020-02-19 RX ADMIN — NAPROXEN 500 MG: 500 TABLET ORAL at 15:12

## 2020-02-19 RX ADMIN — DOCUSATE SODIUM 100 MG: 100 CAPSULE, LIQUID FILLED ORAL at 09:15

## 2020-02-19 RX ADMIN — NAPROXEN 500 MG: 500 TABLET ORAL at 01:33

## 2020-02-19 RX ADMIN — BENZOCAINE AND LEVOMENTHOL: 200; 5 SPRAY TOPICAL at 16:05

## 2020-02-19 RX ADMIN — Medication 10 ML: at 09:16

## 2020-02-19 ASSESSMENT — PAIN SCALES - GENERAL
PAINLEVEL_OUTOF10: 1
PAINLEVEL_OUTOF10: 0
PAINLEVEL_OUTOF10: 3
PAINLEVEL_OUTOF10: 0

## 2020-02-19 NOTE — PROGRESS NOTES
POST PARTUM DAY # 1    Huang Cox is a 21 y.o. female  This patient was seen & examined today. Patient is s/p  20. Her pregnancy was complicated by:   Patient Active Problem List   Diagnosis    ADHD    GERD    Family history of breast cancer in first degree relative    Herpes simplex vulvovaginitis II    HPV (human papilloma virus) infection    Mild intermittent asthma    Anxiety    Major Depressive Disorder    Chronic pain of right knee    Hx of Helicobacter gastritis    BMI 35.0 to 35.9     Family history of spina bifida (brother)    Right ovarian cyst    IBS with constipation/diarrhea    Constipation    Acute midline low back pain without sciatica    UTI in pregnancy    Abnormal glucose tolerance test (GTT) during pregnancy, antepartum    Asthma affecting pregnancy, antepartum    Family history of diabetes mellitus    Headache    39 weeks gestation of pregnancy    GBS bacteriuria     F 2020 F APG 89 Wt 7#6       Today she is doing well without any chief complaint. Her lochia is light. She denies headache, lightheadedness and dizziness. She is not breast feeding and she denies any breast tenderness. She is ambulating well. Her voiding pattern is normal. I reviewed signs and symptoms of post partum depression with the patient, she currently denies any of these symptoms. She is tolerating solids. Patient denies fever, chills, chest pain, shortness of breath, nausea, vomiting, headache, vision changes, or RUQ pain, or calf pain. Pt requesting discharge home today.     Vital Signs:  Vitals:    20 1556 20 1609 20 1659 20 1804   BP: 107/69 124/63 (!) 107/59 110/71   Pulse: 68 76 74 71   Resp: 16 16 16 16   Temp:  97 °F (36.1 °C) 97 °F (36.1 °C)    TempSrc:  Oral Infrared    Weight:       Height:             Physical Exam:  General:  no apparent distress, alert, and cooperative  HEENT: Mouth with R sided aphthous ulcer noted but no erythema, edema, or

## 2020-02-19 NOTE — PROGRESS NOTES
Obstetrical Rounds:    PPD#: [id number] [facility] Day: 2  Procedure: normal spontaneous vaginal delivery    Date: 2020  Time: 6:43 AM        Patient Name: Jitendra Ma  Patient : 1995  Room/Bed: 4010/4970-99  Admission Date/Time: 2020  6:12 PM  MRN #: [id number]  [facility] #: [id number]        Attending Physician Statement  I have discussed the care of Jitendra Ma, including pertinent history and exam findings,  with the resident. I have reviewed their note in the electronic medical record. I have seen and examined the patient and the key elements of all parts of the encounter have been performed/reviewed by me . I agree with the assessment, plan and orders as documented by the resident. Pt seen & examined. Pt without c/c. Pt ambulating & urinating without difficulty. Pt requesting discharge home today.  Pt to follow up office 2 weeks    Vitals:    20 1804   BP: 110/71   Pulse: 71   Resp: 16   Temp:        Admission on 2020   Component Date Value Ref Range Status    WBC 2020 11.3* 3.5 - 11.0 k/uL Final    RBC 2020 4.26  4.0 - 5.2 m/uL Final    Hemoglobin 2020 11.8* 12.0 - 16.0 g/dL Final    Hematocrit 2020 35.5* 36 - 46 % Final    MCV 2020 83.3  80 - 100 fL Final    MCH 2020 27.7  26 - 34 pg Final    MCHC 2020 33.2  31 - 37 g/dL Final    RDW 2020 14.7  11.5 - 14.9 % Final    Platelets  170  150 - 450 k/uL Final    MPV 2020 10.5  6.0 - 12.0 fL Final    NRBC Automated 2020 NOT REPORTED  per 100 WBC Final    Differential Type 2020 NOT REPORTED   Final    Seg Neutrophils 2020 66  36 - 66 % Final    Lymphocytes 2020 26  24 - 44 % Final    Monocytes 2020 6  1 - 7 % Final    Eosinophils % 2020 1  0 - 4 % Final    Basophils 2020 1  0 - 2 % Final    Immature Granulocytes 2020 NOT REPORTED  0 % Final    Segs Absolute 2020 7.50  1.3 - 9.1 k/uL Final    Absolute Lymph # (Positive cutoff 50 ng/mL)                  Oxycodone Screen, Ur 02/17/2020 NEGATIVE  NEGATIVE Final    Comment:       (Positive cutoff 100 ng/mL)                  Methamphetamine, Urine 02/17/2020 NOT REPORTED  NEGATIVE Final    Tricyclic Antidepressants, Urine 02/17/2020 NOT REPORTED  NEGATIVE Final    MDMA, Urine 02/17/2020 NOT REPORTED  NEGATIVE Final    Buprenorphine Urine 02/17/2020 NOT REPORTED  NEGATIVE Final    Test Information 02/17/2020 Assay provides medical screening only. The absence of expected drug(s) and/or metabolite(s) may indicate diluted or adulterated urine, limitations of testing or timing of collection. Final    Comment: Testing for legal purposes should be confirmed by another method. To request confirmation   of test result, please call the lab within 7 days of sample submission.  Color, UA 02/17/2020 YELLOW  YELLOW Final    Turbidity UA 02/17/2020 CLOUDY* CLEAR Final    Glucose, Ur 02/17/2020 NEGATIVE  NEGATIVE Final    Bilirubin Urine 02/17/2020 Presumptive positive. Unable to confirm due to unavailability of reagent. * NEGATIVE Final    Ketones, Urine 02/17/2020 SMALL* NEGATIVE Final    Specific Gravity, UA 02/17/2020 1.031* 1.000 - 1.030 Final    Urine Hgb 02/17/2020 NEGATIVE  NEGATIVE Final    pH, UA 02/17/2020 6.0  5.0 - 8.0 Final    Protein, UA 02/17/2020 1+* NEGATIVE Final    Urobilinogen, Urine 02/17/2020 Normal  Normal Final    Nitrite, Urine 02/17/2020 NEGATIVE  NEGATIVE Final    Leukocyte Esterase, Urine 02/17/2020 TRACE* NEGATIVE Final    Urinalysis Comments 02/17/2020 NOT REPORTED   Final    - 02/17/2020        Final    WBC, UA 02/17/2020 5 TO 10  /HPF Final    RBC, UA 02/17/2020 0 TO 2  /HPF Final    Casts UA 02/17/2020 NOT REPORTED  /LPF Final    Crystals UA 02/17/2020 NOT REPORTED  None /HPF Final    Epithelial Cells UA 02/17/2020 20 TO 50  /HPF Final    Renal Epithelial, Urine 02/17/2020 NOT REPORTED  0 /HPF Final    Bacteria, UA 02/17/2020 MANY* None Final    Mucus, UA 02/17/2020 2+* None Final    Trichomonas, UA 02/17/2020 NOT REPORTED  None Final    Amorphous, UA 02/17/2020 NOT REPORTED  None Final    Other Observations UA 02/17/2020 NOT REPORTED  NOT REQ. Final    Yeast, UA 02/17/2020 NOT REPORTED  None Final    Specimen Description 02/17/2020 . CLEAN CATCH URINE   Final    Special Requests 02/17/2020 NOT REPORTED   Final    Culture 02/17/2020 NO SIGNIFICANT GROWTH   Final    Hemoglobin A1C 02/17/2020 5.2  4.0 - 6.0 % Final    Estimated Avg Glucose 02/17/2020 103  mg/dL Final    Comment: The ADA and AACC recommend providing the estimated average glucose result to permit better   patient understanding of their HBA1c result. Discharge Instructions for Labor and Delivery, Vaginal Birth     A vaginal birth refers to the baby being delivered through the vagina. The amount of time that labor can take varies greatly. Labor for the average first-born baby is about 12 hours. Usually your hospital stay after a routine delivery is no more than two nights. Some new mothers go home the same day. Recovery from childbirth varies depending upon whether you had an episiotomy (an incision in the perineum, the area between your vaginal opening and your anus), the duration of labor and delivery, and the amount of rest you get. In general, it takes about 6-8 weeks for a woman's body to recover from childbirth. What You Will Need   Along with your medications, you will need the following:   · Sanitary pads    · Nursing pads    · Witch hazel pads    · Sitz bath    Steps to [address] will want to arrange for transportation home for you and your baby. The baby will need a car seat. You will receive instructions in the hospital for breastfeeding and taking care of the perineum area. You may use ointment for cracked nipples or warm water rinses to your perineum.    · You will need to wear sanitary pads for about six weeks after delivery. · If you had an episiotomy or vaginal tear, you will be sent home with a plastic squirt bottle. Fill it with warm water and squirt over the vaginal and anal area every time you urinate and defecate. · Warm baths can be soothing to healing tissues. · Apply warm or cold cloths to sore breasts. · Apply ointment to cracked nipples. · Use nursing pads for leaky breast.    · Apply witch hazel pads to sore perineum (area between vagina and anus). · Ask your doctor about when it is safe for you to shower or bathe. · Sit in a sitz bath to soothe sore perineum and/or hemorrhoids. A sitz bath is soaking the hip and buttocks area in warm water. You can buy a plastic sitz bath at most [employer]. It will fit on your toilet. You can also use your bathtub. Diet    Eat a well balanced, healthy diet to help your recover from childbirth. If you are breastfeeding, you will need additional calories each day. You may also be advised to avoid certain foods. Some women experience constipation after childbirth. To avoid this problem:   · Drink plenty of fluids. · Eat foods high in fiber, such as:   ¨ Whole grain cereals and breads   ¨ Fruits and vegetables   ¨ Legumes (eg, beans, lentils)   Physical Activity    Labor and delivery is tiring. Rest when you can to regain your energy. Your doctor will encourage you to exercise by walking. Ask your doctor when you will be able to return to more strenuous exercise. Your doctor may suggest you do Kegel exercises to strengthen your pelvic muscles. To do these tighten your muscles as if you were stopping your urine flow. Hold for a few seconds and then relax. Do these throughout the day. Medications    Breastfeeding can cause your uterus to contract. If painful, your doctor may recommend a pain reliever. If you are breastfeeding, it's important to ask your doctor about taking medications.  You may receive from the hospital a list of medications to avoid. Once your doctor has approved your medications, it's important to:   · Take your medication as directed. Do not change the amount or the schedule. · Do not stop taking them without talking to your doctor. · Do not share them. · Know what the results and side effects may be. Report bothersome side effects to your doctor. · Some drugs can be dangerous when mixed. Talk to a doctor or pharmacist if you are taking more than one drug. This includes over-the-counter medication and herb or dietary supplements. · Plan ahead for refills so you don't run out. Lifestyle Changes    Having a baby requires significant lifestyle changes. You and your doctor will plan lifestyle changes that will help you recover. Some things to keep in mind include:   · It is important to get enough rest so you can recover. Try sleeping when the baby sleeps. · Ask your doctor when you can resume sexual relations. If you have not done so already, talk to your doctor about birth control options. · If you are breastfeeding, consider a breast pump. · Call your obstetrician and/or pediatrician for any questions that arise. · Understand the changes your body is going through as it recovers from childbirth:   ¨ Hot and cold flashes as your body adjusts to new hormone and blood flow levels   ¨ Urinary or fecal incontinence due to stretched muscles   ¨ After pains from uterine contractions as the uterus shrinks   ¨ Vaginal bleeding (called lochia), which is heavier than your period (generally stops within two months)   ¨ Baby blues, feelings of irritability, sadness, crying, or anxiety. Postpartum depression is more severe, occurring in 10%-20% of new moms. If you experience such symptoms, contact your doctor. · Consider joining a support group for new mothers. You can get encouragement and learn parenting strategies. Follow-up   Schedule a follow-up appointment as directed by your doctor.    Call Your Doctor If Any of the Following Occurs   It is important for you to monitor your recovery once you leave the hospital. That way, you can alert your doctor to any problems immediately. If any of the following occurs, call your doctor:   · Signs of infection, including fever and chills    · Increased bleeding: soaking more than one sanitary pad an hour    · Wounds that become red, swollen or drain pus    · Vaginal discharge that smells foul    · New pain, swelling, or tenderness in your legs    · Pain that you can't control with the medications you've been given    · Pain, burning, urgency or frequency of urination, or persistent bleeding in the urine    · Cough, shortness of breath, or chest pain    · Depression, suicidal thoughts, or feelings of harming your baby    · Breasts that are hot, red and accompanied by fever    · Any cracking or bleeding from the nipple or areola (the dark-colored area of the breast)      In case of an emergency, call 911 immediately. Home care, Restrictions,  Follow up Care, and birth control review completed    RTO 2 weeks    Secondary Smoke risks and Sudden Infant Death Syndrome were reviewed with recommendations. Cessation options discussed. Signs and Symptoms of Post Partum Depression were reviewed. The patient is to call if any occur. Signs and symptoms of Mastitis were reviewed. The patient is to call if any occur for follow up.       Attending's Name:  Electronically signed by Olga Fowler DO on 2/19/2020 at 6:43 AM

## 2020-02-19 NOTE — PROGRESS NOTES
Labs reviewed. Hgb dropped from 11.8 to 9.3. Patient asymptomatic. VSS    Recent Results (from the past 3 hour(s))   Hemoglobin and hematocrit, blood    Collection Time: 02/19/20  6:55 AM   Result Value Ref Range    Hemoglobin 9.3 (L) 12.0 - 16.0 g/dL    Hematocrit 28.4 (L) 36 - 46 %     Updated Dr. Kulwinder Abad  Patient requests DC today;  Ok per provider  Continue to monitor for s/s of anemia  Iron on DC and 2 week postpartum H/H ordered for outpatient    Edgar Joiner  OB-GYN Resident

## 2020-02-20 LAB — SURGICAL PATHOLOGY REPORT: NORMAL

## 2020-02-27 PROBLEM — Z3A.39 39 WEEKS GESTATION OF PREGNANCY: Status: RESOLVED | Noted: 2020-02-17 | Resolved: 2020-02-27

## 2020-03-02 PROBLEM — J45.909 ASTHMA AFFECTING PREGNANCY, ANTEPARTUM: Status: RESOLVED | Noted: 2019-12-12 | Resolved: 2020-03-02

## 2020-03-02 PROBLEM — O99.519 ASTHMA AFFECTING PREGNANCY, ANTEPARTUM: Status: RESOLVED | Noted: 2019-12-12 | Resolved: 2020-03-02

## 2020-03-02 PROBLEM — O99.810 ABNORMAL GLUCOSE TOLERANCE TEST (GTT) DURING PREGNANCY, ANTEPARTUM: Status: RESOLVED | Noted: 2019-12-12 | Resolved: 2020-03-02

## 2020-03-03 ENCOUNTER — OFFICE VISIT (OUTPATIENT)
Dept: OBGYN CLINIC | Age: 25
End: 2020-03-03
Payer: COMMERCIAL

## 2020-03-03 VITALS
DIASTOLIC BLOOD PRESSURE: 66 MMHG | BODY MASS INDEX: 30.44 KG/M2 | WEIGHT: 151 LBS | HEIGHT: 59 IN | SYSTOLIC BLOOD PRESSURE: 104 MMHG

## 2020-03-03 PROCEDURE — 99212 OFFICE O/P EST SF 10 MIN: CPT | Performed by: NURSE PRACTITIONER

## 2020-03-03 NOTE — PROGRESS NOTES
in detail. The patient has elected to proceed with a vaginal delivery unless the lesions become obstructive precluding a vaginal delivery. She was counseled on the potential risks to her child both short and long term sequelae.  Family history of breast cancer in first degree relative      Priority: Medium     Overview Note:     PGM, Paunt in 45s ? ?      GERD      Priority: Medium    Postpartum state     GBS bacteriuria 2020     F 2020 F APG 8/9 Wt 7#6 2020    Headache     Acute midline low back pain without sciatica 2019    Constipation 2019    IBS with constipation/diarrhea 10/28/2019    Right ovarian cyst 10/27/2019     Overview Note:     2019 1.37x1.32x1.38      BMI 35.0 to 35.9      Hx of Helicobacter gastritis 2019    Chronic pain of right knee 2018    Mild intermittent asthma 2017    Anxiety 2017    Major Depressive Disorder 2017         She does admit to having good home support.       OB History    Para Term  AB Living   1 1 1 0 0 1   SAB TAB Ectopic Molar Multiple Live Births   0 0 0 0 0 1      # Outcome Date GA Lbr Narayan/2nd Weight Sex Delivery Anes PTL Lv   1 Term 20 39w2d 05:05 / 00:11 7 lb 6.9 oz (3.37 kg) F Vag-Spont Local N KEAGAN      Name: Hurshel Boxer: Steve  Apgar5: 9       Patient Active Problem List   Diagnosis    ADHD    GERD    Family history of breast cancer in first degree relative    Herpes simplex vulvovaginitis II    HPV (human papilloma virus) infection    Mild intermittent asthma    Anxiety    Major Depressive Disorder    Chronic pain of right knee    Hx of Helicobacter gastritis    BMI 35.0 to 35.9     Family history of spina bifida (brother)    Right ovarian cyst    IBS with constipation/diarrhea    Constipation    Acute midline low back pain without sciatica    UTI in pregnancy    Family history of diabetes mellitus    Headache    GBS bacteriuria     F 2020 F APG 8/9 Wt 7#6    Postpartum state       Blood pressure 104/66, height 4' 11\" (1.499 m), weight 151 lb (68.5 kg), unknown if currently breastfeeding. Abdomen: Soft and non-tender; good bowel sounds; no guarding, rebound or rigidity; no CVA tenderness bilaterally. Extremities: No calf tenderness bilaterally. DTR 2/4 bilaterally. No edema. Perineum: intact    Assessment:   Diagnosis Orders   1. Routine postpartum follow-up       Chief Complaint   Patient presents with    Postpartum Care     Patient Active Problem List    Diagnosis Date Noted    Family history of diabetes mellitus 2019     Priority: High    UTI in pregnancy 2019     Priority: High     2019 treated with Keflex- ER      Family history of spina bifida (brother) 10/27/2019     Priority: High     2019 Follow up in 2 weeks for BPP and fetal echo- per patient prefernce  Declined invasive testing  Patient opted for non-invasive testing        Herpes simplex vulvovaginitis II 2016     Priority: High      if outbreak at term  Valtrex at 36 weeks   STD counseling and barrier RECS      ADHD 2011     Priority: High     Class: Chronic    HPV (human papilloma virus) infection 2016     Priority: Medium     Counseling on Laryngeal Papillomatosis:     Laryngeal papillomatosis, also known as recurrent respiratory papillomatosis or glottal papillomatosis, is a rare medical condition (2 per 100,000 adults and 4.5 per 100,000 children)[1]:411, caused by a HPV infection of the throat.[2]:411 Laryngeal papillomatosis causes assorted tumors or papillomas to develop over a period of time. It usually develops from strains 6 or 11 of the HPV virus. Route of delivery with morbidity rates of both vaginal and  section were reviewed in detail. The patient has elected to proceed with a vaginal delivery unless the lesions become obstructive precluding a vaginal delivery.

## 2020-04-28 RX ORDER — OMEPRAZOLE 20 MG/1
CAPSULE, DELAYED RELEASE ORAL
Qty: 30 CAPSULE | Refills: 3 | Status: SHIPPED | OUTPATIENT
Start: 2020-04-28 | End: 2020-05-19

## 2020-04-28 NOTE — TELEPHONE ENCOUNTER
Please Approve or Refuse.   Send to Pharmacy per Pt's Request:      Next Visit Date:  6/30/2020   Last Visit Date: 11/11/2019    Hemoglobin A1C (%)   Date Value   02/17/2020 5.2   12/05/2018 5.3   03/29/2017 5.3             ( goal A1C is < 7)   BP Readings from Last 3 Encounters:   03/03/20 104/66   02/19/20 110/69   02/13/20 112/76          (goal 120/80)  BUN   Date Value Ref Range Status   10/31/2019 6 6 - 20 mg/dL Final     CREATININE   Date Value Ref Range Status   10/31/2019 0.62 0.50 - 0.90 mg/dL Final     Potassium   Date Value Ref Range Status   10/31/2019 3.8 3.7 - 5.3 mmol/L Final

## 2020-05-19 RX ORDER — OMEPRAZOLE 20 MG/1
CAPSULE, DELAYED RELEASE ORAL
Qty: 30 CAPSULE | Refills: 3 | Status: SHIPPED | OUTPATIENT
Start: 2020-05-19 | End: 2021-05-21 | Stop reason: SDUPTHER

## 2020-09-23 ENCOUNTER — CARE COORDINATION (OUTPATIENT)
Dept: CARE COORDINATION | Age: 25
End: 2020-09-23

## 2020-09-23 NOTE — CARE COORDINATION
Chart reviewed. Call placed to patient to introduce self and discussed CC program and benefits of participation. Voicemail received. Message left with callback info, requesting returned call.   Will follow up within one week

## 2020-10-01 ENCOUNTER — CARE COORDINATION (OUTPATIENT)
Dept: CARE COORDINATION | Age: 25
End: 2020-10-01

## 2020-10-01 NOTE — CARE COORDINATION
Call placed to patient. Spoke with Muriel Busch. Writer introduced self and explained reason for call is to explain the benefits and services of care coordination program.  Patient declined service at this time. Advised patient should she reconsider to contact [address] [place] [address] at [phone]. Patient verbalized understanding.

## 2021-05-21 DIAGNOSIS — K21.9 GASTROESOPHAGEAL REFLUX DISEASE WITHOUT ESOPHAGITIS: ICD-10-CM

## 2021-05-21 RX ORDER — OMEPRAZOLE 20 MG/1
20 CAPSULE, DELAYED RELEASE ORAL DAILY
Qty: 30 CAPSULE | Refills: 3 | Status: SHIPPED | OUTPATIENT
Start: 2021-05-21 | End: 2021-09-07

## 2021-05-21 NOTE — TELEPHONE ENCOUNTER
Please Approve or Refuse.   Send to Pharmacy per Pt's Request:     Next Visit Date:  8/2/2021   Last Visit Date: 11/11/2019    Hemoglobin A1C (%)   Date Value   02/17/2020 5.2   12/05/2018 5.3   03/29/2017 5.3             ( goal A1C is < 7)   BP Readings from Last 3 Encounters:   03/03/20 104/66   02/19/20 110/69   02/13/20 112/76          (goal 120/80)  BUN   Date Value Ref Range Status   10/31/2019 6 6 - 20 mg/dL Final     CREATININE   Date Value Ref Range Status   10/31/2019 0.62 0.50 - 0.90 mg/dL Final     Potassium   Date Value Ref Range Status   10/31/2019 3.8 3.7 - 5.3 mmol/L Final

## 2021-08-12 ENCOUNTER — TELEMEDICINE (OUTPATIENT)
Dept: FAMILY MEDICINE CLINIC | Age: 26
End: 2021-08-12
Payer: COMMERCIAL

## 2021-08-12 DIAGNOSIS — J45.20 MILD INTERMITTENT ASTHMA WITHOUT COMPLICATION: Primary | ICD-10-CM

## 2021-08-12 DIAGNOSIS — Z00.00 PREVENTATIVE HEALTH CARE: ICD-10-CM

## 2021-08-12 DIAGNOSIS — E66.09 CLASS 2 OBESITY DUE TO EXCESS CALORIES WITHOUT SERIOUS COMORBIDITY WITH BODY MASS INDEX (BMI) OF 35.0 TO 35.9 IN ADULT: ICD-10-CM

## 2021-08-12 DIAGNOSIS — Z86.19 HX OF HELICOBACTER INFECTION: ICD-10-CM

## 2021-08-12 PROBLEM — R82.71 GBS BACTERIURIA: Status: RESOLVED | Noted: 2020-02-18 | Resolved: 2021-08-12

## 2021-08-12 PROCEDURE — 99213 OFFICE O/P EST LOW 20 MIN: CPT | Performed by: FAMILY MEDICINE

## 2021-08-12 RX ORDER — SUCRALFATE 1 G/1
1 TABLET ORAL 4 TIMES DAILY
Qty: 120 TABLET | Refills: 0 | Status: SHIPPED | OUTPATIENT
Start: 2021-08-12 | End: 2021-09-07

## 2021-08-12 NOTE — PROGRESS NOTES
Carina Allison is a 31 y.o. female evaluated via telephone on 8/12/2021. Chief Complaint   Patient presents with    Gastroesophageal Reflux   \  Consent:  She and/or health care decision maker is aware that that she may receive a bill for this telephone service, depending on her insurance coverage, and has provided verbal consent to proceed: Yes      Documentation:  I communicated with the patient and/or health care decision maker about . Patient is follow-up on GERD has history of H. pylori gastritis. Patient not seen since last 1 year. Patient was pregnant and had a baby 1 year before. Patient reports her GERD is still same, she still gets acid reflux more often currently she is on Prilosec. She was H. pylori positive in the past and was treated. She complains of abdominal distention, denies any nausea vomiting. Asthma stable, denies any shortness of breath or any recent flareups. Obesity patient has lost weight after pregnancy, no recent blood work. Details of this discussion including any medical advice provided:       1. Mild intermittent asthma without complication  Stable on current treatment continue inhalers    2. Hx of Helicobacter gastritis  Recheck H. pylori start on sucralfate continue PPI  - H. Pylori Antigen, Stool; Future  - sucralfate (CARAFATE) 1 GM tablet; Take 1 tablet by mouth 4 times daily  Dispense: 120 tablet; Refill: 0    3. BMI 35.0 to 35.9   Routine blood work, continue lifestyle changes  - Hemoglobin A1C; Future  - CBC Auto Differential; Future  - Comprehensive Metabolic Panel; Future  - Lipid Panel; Future    4. Preventative health care    - Hepatitis C Antibody; Future  - Varicella Zoster Antibody, IgG; Future        I affirm this is a Patient Initiated Episode with a Patient who has not had a related appointment within my department in the past 7 days or scheduled within the next 24 hours.     Patient identification was verified at the start of the visit: Yes    Total Time: minutes: 21-30 minutes    The visit was conducted pursuant to the emergency declaration under the [address] [place] [address] and the [employer] Act. Patient identification was verified, and a caregiver was present when appropriate. The patient was located in a state where the provider was credentialed to provide care.     Note: not billable if this call serves to triage the patient into an appointment for the relevant concern      Luis Manuel Griffith MD

## 2021-09-05 DIAGNOSIS — Z86.19 HX OF HELICOBACTER INFECTION: ICD-10-CM

## 2021-09-05 DIAGNOSIS — K21.9 GASTROESOPHAGEAL REFLUX DISEASE WITHOUT ESOPHAGITIS: ICD-10-CM

## 2021-09-07 RX ORDER — OMEPRAZOLE 20 MG/1
CAPSULE, DELAYED RELEASE ORAL
Qty: 30 CAPSULE | Refills: 3 | Status: SHIPPED | OUTPATIENT
Start: 2021-09-07

## 2021-09-07 RX ORDER — SUCRALFATE 1 G/1
TABLET ORAL
Qty: 120 TABLET | Refills: 0 | Status: SHIPPED | OUTPATIENT
Start: 2021-09-07 | End: 2021-11-15

## 2021-11-15 ENCOUNTER — OFFICE VISIT (OUTPATIENT)
Dept: FAMILY MEDICINE CLINIC | Age: 26
End: 2021-11-15
Payer: COMMERCIAL

## 2021-11-15 VITALS
OXYGEN SATURATION: 98 % | HEIGHT: 59 IN | WEIGHT: 169.2 LBS | DIASTOLIC BLOOD PRESSURE: 74 MMHG | BODY MASS INDEX: 34.11 KG/M2 | HEART RATE: 83 BPM | SYSTOLIC BLOOD PRESSURE: 102 MMHG | TEMPERATURE: 98.1 F

## 2021-11-15 DIAGNOSIS — F32.1 CURRENT MODERATE EPISODE OF MAJOR DEPRESSIVE DISORDER WITHOUT PRIOR EPISODE (HCC): Primary | ICD-10-CM

## 2021-11-15 DIAGNOSIS — F43.21 GRIEF REACTION: ICD-10-CM

## 2021-11-15 DIAGNOSIS — F41.9 ANXIETY: ICD-10-CM

## 2021-11-15 DIAGNOSIS — F90.0 ATTENTION DEFICIT HYPERACTIVITY DISORDER (ADHD), PREDOMINANTLY INATTENTIVE TYPE: ICD-10-CM

## 2021-11-15 DIAGNOSIS — J45.20 MILD INTERMITTENT ASTHMA WITHOUT COMPLICATION: ICD-10-CM

## 2021-11-15 PROBLEM — O23.40 UTI IN PREGNANCY: Status: RESOLVED | Noted: 2019-11-19 | Resolved: 2021-11-15

## 2021-11-15 PROCEDURE — G8427 DOCREV CUR MEDS BY ELIG CLIN: HCPCS | Performed by: FAMILY MEDICINE

## 2021-11-15 PROCEDURE — 99213 OFFICE O/P EST LOW 20 MIN: CPT | Performed by: FAMILY MEDICINE

## 2021-11-15 PROCEDURE — 1036F TOBACCO NON-USER: CPT | Performed by: FAMILY MEDICINE

## 2021-11-15 PROCEDURE — G8417 CALC BMI ABV UP PARAM F/U: HCPCS | Performed by: FAMILY MEDICINE

## 2021-11-15 PROCEDURE — G8484 FLU IMMUNIZE NO ADMIN: HCPCS | Performed by: FAMILY MEDICINE

## 2021-11-15 RX ORDER — BUPROPION HYDROCHLORIDE 100 MG/1
100 TABLET, EXTENDED RELEASE ORAL 2 TIMES DAILY
Qty: 60 TABLET | Refills: 3 | Status: SHIPPED | OUTPATIENT
Start: 2021-11-15

## 2021-11-15 SDOH — ECONOMIC STABILITY: FOOD INSECURITY: WITHIN THE PAST 12 MONTHS, THE FOOD YOU BOUGHT JUST DIDN'T LAST AND YOU DIDN'T HAVE MONEY TO GET MORE.: NEVER TRUE

## 2021-11-15 SDOH — ECONOMIC STABILITY: FOOD INSECURITY: WITHIN THE PAST 12 MONTHS, YOU WORRIED THAT YOUR FOOD WOULD RUN OUT BEFORE YOU GOT MONEY TO BUY MORE.: NEVER TRUE

## 2021-11-15 ASSESSMENT — PATIENT HEALTH QUESTIONNAIRE - PHQ9
1. LITTLE INTEREST OR PLEASURE IN DOING THINGS: 1
SUM OF ALL RESPONSES TO PHQ QUESTIONS 1-9: 8
9. THOUGHTS THAT YOU WOULD BE BETTER OFF DEAD, OR OF HURTING YOURSELF: 0
SUM OF ALL RESPONSES TO PHQ QUESTIONS 1-9: 8
8. MOVING OR SPEAKING SO SLOWLY THAT OTHER PEOPLE COULD HAVE NOTICED. OR THE OPPOSITE, BEING SO FIGETY OR RESTLESS THAT YOU HAVE BEEN MOVING AROUND A LOT MORE THAN USUAL: 0
SUM OF ALL RESPONSES TO PHQ9 QUESTIONS 1 & 2: 3
3. TROUBLE FALLING OR STAYING ASLEEP: 1
4. FEELING TIRED OR HAVING LITTLE ENERGY: 1
10. IF YOU CHECKED OFF ANY PROBLEMS, HOW DIFFICULT HAVE THESE PROBLEMS MADE IT FOR YOU TO DO YOUR WORK, TAKE CARE OF THINGS AT HOME, OR GET ALONG WITH OTHER PEOPLE: 1
SUM OF ALL RESPONSES TO PHQ QUESTIONS 1-9: 8
7. TROUBLE CONCENTRATING ON THINGS, SUCH AS READING THE NEWSPAPER OR WATCHING TELEVISION: 1
2. FEELING DOWN, DEPRESSED OR HOPELESS: 2
5. POOR APPETITE OR OVEREATING: 1
6. FEELING BAD ABOUT YOURSELF - OR THAT YOU ARE A FAILURE OR HAVE LET YOURSELF OR YOUR FAMILY DOWN: 1

## 2021-11-15 ASSESSMENT — ENCOUNTER SYMPTOMS
CHEST TIGHTNESS: 0
CONSTIPATION: 0
ABDOMINAL DISTENTION: 0
COUGH: 0
ABDOMINAL PAIN: 0
WHEEZING: 0
SHORTNESS OF BREATH: 0

## 2021-11-15 ASSESSMENT — SOCIAL DETERMINANTS OF HEALTH (SDOH): HOW HARD IS IT FOR YOU TO PAY FOR THE VERY BASICS LIKE FOOD, HOUSING, MEDICAL CARE, AND HEATING?: SOMEWHAT HARD

## 2021-11-15 NOTE — PROGRESS NOTES
Chief Complaint   Patient presents with    Weight Management    Constipation     resolved, hasn't follow with GI in awhile         Ericka Hamper  here today for follow up on chronic medical problems, go over labs and/or diagnostic studies, and medication refills. Weight Management and Constipation (resolved, hasn't follow with GI in awhile)      HPI: Patient is here for follow-up not seen since last 1 year. Patient reports she is stressed and depressed, she lost her fiancé due to Covid. Patient reports she is not able to focus she has history of depression anxiety in the past also and was taking Zoloft but she was not able to tolerate that and was feeling fatigue and tired. Patient reports she is single mom and she does not work. She has always difficulty in working because of anxiety and depression. Patient reports she is in the process of moving to a different place. She cannot afford  for her daughter. Patient is not driving reports she never had driving license. Patient wants to restart medications and is willing to do counseling. Patient denies any bad thoughts of hurting herself or others. Patient reports she has crying spells. Patient gets meals he gets and is asking for a letter for some time until she will be able to move. Patient is willing to get treated for depression anxiety and will try to look for some work-up for that. Patient reports she is getting bills and is not able to pay. /74   Pulse 83   Temp 98.1 °F (36.7 °C) (Temporal)   Ht 4' 11\" (1.499 m)   Wt 169 lb 3.2 oz (76.7 kg)   LMP 11/01/2021   SpO2 98%   BMI 34.17 kg/m²    Body mass index is 34.17 kg/m². Wt Readings from Last 3 Encounters:   11/15/21 169 lb 3.2 oz (76.7 kg)   03/03/20 151 lb (68.5 kg)   02/17/20 178 lb (80.7 kg)        [x]Negative depression screening.   PHQ Scores 11/15/2021 11/11/2019 5/23/2019 12/5/2018 9/12/2017 7/14/2016   PHQ2 Score 3 0 0 1 0 0   PHQ9 Score 8 0 0 1 0 0 []1-4 = Minimal depression   []5-9 = Milddepression   []10-14 = Moderate depression   []15-19 = Moderately severe depression   []20-27 = Severe depression    Discussed testing with the patient and all questions fully answered.     Admission on 02/17/2020, Discharged on 02/19/2020   Component Date Value Ref Range Status    WBC 02/17/2020 11.3* 3.5 - 11.0 k/uL Final    RBC 02/17/2020 4.26  4.0 - 5.2 m/uL Final    Hemoglobin 02/17/2020 11.8* 12.0 - 16.0 g/dL Final    Hematocrit 02/17/2020 35.5* 36 - 46 % Final    MCV 02/17/2020 83.3  80 - 100 fL Final    MCH 02/17/2020 27.7  26 - 34 pg Final    MCHC 02/17/2020 33.2  31 - 37 g/dL Final    RDW 02/17/2020 14.7  11.5 - 14.9 % Final    Platelets 26/46/0029 170  150 - 450 k/uL Final    MPV 02/17/2020 10.5  6.0 - 12.0 fL Final    NRBC Automated 02/17/2020 NOT REPORTED  per 100 WBC Final    Differential Type 02/17/2020 NOT REPORTED   Final    Seg Neutrophils 02/17/2020 66  36 - 66 % Final    Lymphocytes 02/17/2020 26  24 - 44 % Final    Monocytes 02/17/2020 6  1 - 7 % Final    Eosinophils % 02/17/2020 1  0 - 4 % Final    Basophils 02/17/2020 1  0 - 2 % Final    Immature Granulocytes 02/17/2020 NOT REPORTED  0 % Final    Segs Absolute 02/17/2020 7.50  1.3 - 9.1 k/uL Final    Absolute Lymph # 02/17/2020 3.00  1.0 - 4.8 k/uL Final    Absolute Mono # 02/17/2020 0.70  0.1 - 1.3 k/uL Final    Absolute Eos # 02/17/2020 0.10  0.0 - 0.4 k/uL Final    Basophils Absolute 02/17/2020 0.10  0.0 - 0.2 k/uL Final    Absolute Immature Granulocyte 02/17/2020 NOT REPORTED  0.00 - 0.30 k/uL Final    WBC Morphology 02/17/2020 NOT REPORTED   Final    RBC Morphology 02/17/2020 NOT REPORTED   Final    Platelet Estimate 81/34/5197 NOT REPORTED   Final    Expiration Date 02/17/2020 02/20/2020,2359   Final    Arm Band Number 02/17/2020 [id number]   Final    ABO/Rh 02/17/2020 AB POSITIVE   Final    Antibody Screen 02/17/2020 NEGATIVE   Final    T. pallidum, IgG 02/17/2020 NONREACTIVE  NONREACTIVE Final    Comment:       T. pallidum antibodies are not detected. There is no serological evidence of infection with T. pallidum (early primary syphilis   cannot be excluded). Retest in 2-4 weeks if syphilis is clinically suspect.  Amphetamine Screen, Ur 02/17/2020 NEGATIVE  NEGATIVE Final    Comment:       (Positive cutoff 1000 ng/mL)                  Barbiturate Screen, Ur 02/17/2020 NEGATIVE  NEGATIVE Final    Comment:       (Positive cutoff 200 ng/mL)                  Benzodiazepine Screen, Urine 02/17/2020 NEGATIVE  NEGATIVE Final    Comment:       (Positive cutoff 200 ng/mL)                  Cocaine Metabolite, Urine 02/17/2020 NEGATIVE  NEGATIVE Final    Comment:       (Positive cutoff 300 ng/mL)                  Methadone Screen, Urine 02/17/2020 NEGATIVE  NEGATIVE Final    Comment:       (Positive cutoff 300 ng/mL)                  Opiates, Urine 02/17/2020 NEGATIVE  NEGATIVE Final    Comment:       (Positive cutoff 300 ng/mL)                  Phencyclidine, Urine 02/17/2020 NEGATIVE  NEGATIVE Final    Comment:       (Positive cutoff 25 ng/mL)                  Propoxyphene, Urine 02/17/2020 NOT REPORTED  NEGATIVE Final    Cannabinoid Scrn, Ur 02/17/2020 NEGATIVE  NEGATIVE Final    Comment:       (Positive cutoff 50 ng/mL)                  Oxycodone Screen, Ur 02/17/2020 NEGATIVE  NEGATIVE Final    Comment:       (Positive cutoff 100 ng/mL)                  Methamphetamine, Urine 02/17/2020 NOT REPORTED  NEGATIVE Final    Tricyclic Antidepressants, Urine 02/17/2020 NOT REPORTED  NEGATIVE Final    MDMA, Urine 02/17/2020 NOT REPORTED  NEGATIVE Final    Buprenorphine Urine 02/17/2020 NOT REPORTED  NEGATIVE Final    Test Information 02/17/2020 Assay provides medical screening only. The absence of expected drug(s) and/or metabolite(s) may indicate diluted or adulterated urine, limitations of testing or timing of collection.    Final    Comment: Testing for legal purposes should be confirmed by another method. To request confirmation   of test result, please call the lab within 7 days of sample submission.  Color, UA 02/17/2020 YELLOW  YELLOW Final    Turbidity UA 02/17/2020 CLOUDY* CLEAR Final    Glucose, Ur 02/17/2020 NEGATIVE  NEGATIVE Final    Bilirubin Urine 02/17/2020 Presumptive positive. Unable to confirm due to unavailability of reagent. * NEGATIVE Final    Ketones, Urine 02/17/2020 SMALL* NEGATIVE Final    Specific Gravity, UA 02/17/2020 1.031* 1.000 - 1.030 Final    Urine Hgb 02/17/2020 NEGATIVE  NEGATIVE Final    pH, UA 02/17/2020 6.0  5.0 - 8.0 Final    Protein, UA 02/17/2020 1+* NEGATIVE Final    Urobilinogen, Urine 02/17/2020 Normal  Normal Final    Nitrite, Urine 02/17/2020 NEGATIVE  NEGATIVE Final    Leukocyte Esterase, Urine 02/17/2020 TRACE* NEGATIVE Final    Urinalysis Comments 02/17/2020 NOT REPORTED   Final    - 02/17/2020        Final    WBC, UA 02/17/2020 5 TO 10  /HPF Final    RBC, UA 02/17/2020 0 TO 2  /HPF Final    Casts UA 02/17/2020 NOT REPORTED  /LPF Final    Crystals, UA 02/17/2020 NOT REPORTED  None /HPF Final    Epithelial Cells UA 02/17/2020 20 TO 50  /HPF Final    Renal Epithelial, UA 02/17/2020 NOT REPORTED  0 /HPF Final    Bacteria, UA 02/17/2020 MANY* None Final    Mucus, UA 02/17/2020 2+* None Final    Trichomonas, UA 02/17/2020 NOT REPORTED  None Final    Amorphous, UA 02/17/2020 NOT REPORTED  None Final    Other Observations UA 02/17/2020 NOT REPORTED  NOT REQ. Final    Yeast, UA 02/17/2020 NOT REPORTED  None Final    Specimen Description 02/17/2020 . CLEAN CATCH URINE   Final    Special Requests 02/17/2020 NOT REPORTED   Final    Culture 02/17/2020 NO SIGNIFICANT GROWTH   Final    Hemoglobin A1C 02/17/2020 5.2  4.0 - 6.0 % Final    Estimated Avg Glucose 02/17/2020 103  mg/dL Final    Comment: The ADA and AACC recommend providing the estimated average glucose result to permit better   patient understanding of their HBA1c result.  Hemoglobin 2020 9.3* 12.0 - 16.0 g/dL Final    Hematocrit 2020 28.4* 36 - 46 % Final    Surgical Pathology Report 2020    Final                    [id number]  [address]  [phone]  Fax: [phone]  SURGICAL PATHOLOGY REPORT    Patient Name: Remigio Huber  MR#: [id number]  Specimen #[id number]       Final Diagnosis  Placenta, removal:       - Mature chorionic villi consistent with third trimester  pregnancy showing focal         microcalcifications and chorangiosis (see comment). - Fetal membranes with no pathologic changes. - Umbilical cord with three vessels, hematoma formation, no  evidence of inflammation. Diagnosis Comment  The presence of chorangiosis is associated with hypoxia and maternal  pathologic conditions such as preeclampsia, diabetes, drug ingestion,  and urinary tract infections. Hypoxia may lead to fetal congenital  anomalies or a low Apgar index. Other associations include placental  abruption. Clinical correlation is recommended. Jael Estevez M.D.  **Electronically Signed Out**         vvg/2020    Clinical Inform                          ation  , baby girl @ [phone], apgar: 8/9, wgt: [phone] (7#7), GA: 44     Source:  A: Placenta    Gross Description  The container is labeled with the patient's name only. Received in  formalin is an irregularly shaped placenta measuring 21 x 16.5 x 2.5  cm and weighing 651 grams. The fetal surface is bluish gray with  normal vessel arborization. The maternal surface has an intact  appearing pattern of cotyledons with no evidence of any hemorrhages,  infarcts, or tears. The fetal membranes have a normal appearing  pattern of insertion. They are smooth and semitransparent with no  gross evidence of meconium staining. The umbilical cord inserts  eccentrically and is 5 cm from the nearest edge. It measures 21.5 cm  in length by 1.5 cm in diameter. Section of the cord reveals three  apparent vessels. Representative sections are submitted in four  cassettes labeled 1-4 as follows:         Cassettes 1-2: Placenta       Cassette 3: Fetal membranes       Cassette 4: Umbilical cord                               Microscopic Description  Four H&E slides reviewed. Microscopic examination performed and  supports the diagnostic impression.            Most recent labs reviewed:     Lab Results   Component Value Date    WBC 11.3 (H) 02/17/2020    HGB 9.3 (L) 02/19/2020    HCT 28.4 (L) 02/19/2020    MCV 83.3 02/17/2020     02/17/2020       @BRIEFLAB(NA,K,CL,CO2,BUN,CREATININE,GLUCOSE,CALCIUM)@     Lab Results   Component Value Date    ALT 13 10/31/2019    AST 17 10/31/2019    ALKPHOS 83 10/31/2019    BILITOT 0.19 (L) 10/31/2019       Lab Results   Component Value Date    TSH 1.16 12/05/2019       Lab Results   Component Value Date    CHOL 190 03/29/2017    CHOL 186 02/04/2015    CHOL 170 05/10/2013     Lab Results   Component Value Date    TRIG 108 03/29/2017    TRIG 140 02/04/2015    TRIG 179 (H) 05/10/2013     Lab Results   Component Value Date    HDL 41 03/29/2017    HDL 40 (L) 02/04/2015    HDL 42 05/10/2013     Lab Results   Component Value Date    LDLCHOLESTEROL 127 03/29/2017    LDLCHOLESTEROL 118 02/04/2015    LDLCHOLESTEROL 92 05/10/2013     Lab Results   Component Value Date    VLDL NOT REPORTED 03/29/2017    VLDL NOT REPORTED 02/04/2015    VLDL NOT REPORTED 05/10/2013     Lab Results   Component Value Date    CHOLHDLRATIO 4.6 03/29/2017    CHOLHDLRATIO 4.7 02/04/2015    CHOLHDLRATIO 4.0 05/10/2013       Lab Results   Component Value Date    LABA1C 5.2 02/17/2020       No results found for: [id number]    No results found for: FOLATE    No results found for: IRON, TIBC, FERRITIN    No results found for: VITD25          Current Outpatient Medications   Medication Sig Dispense Refill    buPROPion [facility] SR) 100 MG extended release tablet Take 1 tablet by mouth 2 times daily 60 tablet 3    omeprazole (PRILOSEC) 20 MG delayed release capsule take 1 capsule by mouth once daily 30 capsule 3    albuterol sulfate  (90 Base) MCG/ACT inhaler Inhale 2 puffs into the lungs 4 times daily as needed for Wheezing 3 Inhaler 3    Elastic Bandages & Supports (ACE KNEE BRACE HINGED) MISC Use daily to during knee pain 1 each 0    FLOVENT  MCG/ACT inhaler inhale 1 puff by mouth twice a day 12 g 3     No current facility-administered medications for this visit. Social History     Socioeconomic History    Marital status: Single     Spouse name: Not on file    Number of children: Not on file    Years of education: Not on file    Highest education level: Not on file   Occupational History    Not on file   Tobacco Use    Smoking status: Former Smoker     Quit date: 2012     Years since quittin.1    Smokeless tobacco: Never Used   Vaping Use    Vaping Use: Never used   Substance and Sexual Activity    Alcohol use: No    Drug use: No    Sexual activity: Yes     Partners: Male   Other Topics Concern    Not on file   Social History Narrative    Not on file     Social Determinants of Health     Financial Resource Strain: Medium Risk    Difficulty of Paying Living Expenses: Somewhat hard   Food Insecurity: No Food Insecurity    Worried About Running Out of Food in the Last Year: Never true    Natasha of Food in the Last Year: Never true   Transportation Needs:     Lack of Transportation (Medical): Not on file    Lack of Transportation (Non-Medical):  Not on file   Physical Activity:     Days of Exercise per Week: Not on file    Minutes of Exercise per Session: Not on file   Stress:     Feeling of Stress : Not on file   Social Connections:     Frequency of Communication with Friends and Family: Not on file    Frequency of Social Gatherings with Friends and Family: Not on file    Attends Taoist Services: Not on file    Active Member of Clubs or Organizations: Not on file    Attends Club or Organization Meetings: Not on file    Marital Status: Not on file   Intimate Partner Violence:     Fear of Current or Ex-Partner: Not on file    Emotionally Abused: Not on file    Physically Abused: Not on file    Sexually Abused: Not on file   Housing Stability:     Unable to Pay for Housing in the Last Year: Not on file    Number of [place] in the Last Year: Not on file    Unstable Housing in the Last Year: Not on file     Counseling given: Yes        Family History   Problem Relation Age of Onset    Depression Mother     Lung Cancer Father     Depression Father     Osteoporosis Father     Breast Cancer Paternal Aunt     Diabetes Paternal Aunt     Thyroid Disease Paternal Aunt     Ovarian Cancer Paternal Aunt     Cancer Paternal Grandmother         breast/lung    Diabetes Paternal Grandmother     Diabetes Maternal Grandmother     Cancer Paternal Grandfather         lung    Other Brother         spina bifida    Asthma Brother              -rest of complaints with corresponding details per ROS    The patient's past medical, surgical, social, and family history as well as her current medications and allergies were reviewed as documented intoday's encounter. Review of Systems   Constitutional: Positive for fatigue. Negative for activity change, appetite change, diaphoresis, fever and unexpected weight change. HENT: Negative for congestion, ear pain and nosebleeds. Eyes: Negative for visual disturbance. Respiratory: Negative for cough, chest tightness, shortness of breath and wheezing. Cardiovascular: Negative for chest pain, palpitations and leg swelling. Gastrointestinal: Negative for abdominal distention, abdominal pain and constipation. Neurological: Negative for dizziness, speech difficulty, numbness and headaches.    Psychiatric/Behavioral: inattentive type  Discussed we can start medications at next appointment to help with focusing. Orders Placed This Encounter   Procedures   [address]     Referral Priority:   Routine     Referral Type:   Eval and Treat     Referral Reason:   Specialty Services Required     Referred to Provider:   Xiao Pike, PhD     Requested Specialty:   Maged Silva     Number of Visits Requested:   1         Medications Discontinued During This Encounter   Medication Reason    polyethylene glycol (MIRALAX) powder Therapy completed    RA ANTACID/ANTI--200-20 MG/5ML SUSP suspension Therapy completed    sucralfate (CARAFATE) 1 GM tablet     naproxen (NAPROSYN) 500 MG tablet Patient Choice    docusate sodium (COLACE, DULCOLAX) 100 MG CAPS LIST CLEANUP       Celestine Farley received counseling on the following healthy behaviors: nutrition, exercise and medication adherence  Reviewed prior labs and health maintenance  Continue current medications, diet and exercise. Discussed use, benefit, and side effects of prescribed medications. Barriers to medication compliance addressed. Patient given educational materials - see patient instructions  Was a self-tracking handout given in paper form or via [employer]? Yes    Requested Prescriptions     Signed Prescriptions Disp Refills    buPROPion (WELLBUTRIN SR) 100 MG extended release tablet 60 tablet 3     Sig: Take 1 tablet by mouth 2 times daily       All patient questions answered. Patient voiced understanding. Quality Measures    Body mass index is 34.17 kg/m². Elevated. Weight control planned discussed daily exercise regimen and Healthy diet and regular exercise. BP: 102/74 Blood pressure is normal. Treatment plan consists of Weight Reduction, DASH Eating Plan, Dietary Sodium Restriction and No treatment change needed.     Lab Results   Component Value Date    LDLCHOLESTEROL 127 03/29/2017    (goal LDL reduction with dx if diabetes is 50% LDL

## 2021-11-15 NOTE — PROGRESS NOTES
Visit Information    Have you changed or started any medications since your last visit including any over-the-counter medicines, vitamins, or herbal medicines? no   Are you having any side effects from any of your medications? -  no  Have you stopped taking any of your medications? Is so, why? -  no    Have you seen any other physician or provider since your last visit? Yes - Records Obtained  Have you had any other diagnostic tests since your last visit? No  Have you been seen in the emergency room and/or had an admission to a hospital since we last saw you? No  Have you had your routine dental cleaning in the past 6 months? no    Have you activated your [employer] account? If not, what are your barriers?  Yes     Patient Care Team:  Fidel Wade MD as PCP - General (Family Medicine)  Fidel Wade MD as PCP - [facility]  Paulo Quevedo MD as Consulting Physician (Gastroenterology)  Vanessa Araujo DO as Consulting Physician (Obstetrics & Gynecology)  Jason Chand MD as Obstetrician (Perinatology)    Medical History Review  Past Medical, Family, and Social History reviewed and does contribute to the patient presenting condition    Health Maintenance   Topic Date Due    Hepatitis C screen  Never done    Varicella vaccine (1 of 2 - 2-dose childhood series) Never done    Pneumococcal 0-64 years Vaccine (1 of 2 - PPSV23) Never done    COVID-19 Vaccine (1) Never done    Flu vaccine (1) 09/01/2021    Pap smear  11/19/2022    DTaP/Tdap/Td vaccine (8 - Td or Tdap) 12/05/2029    Hepatitis B vaccine  Completed    Hib vaccine  Completed    Meningococcal (ACWY) vaccine  Completed    HIV screen  Completed    Hepatitis A vaccine  Aged Out

## 2021-12-30 ENCOUNTER — TELEPHONE (OUTPATIENT)
Dept: FAMILY MEDICINE CLINIC | Age: 26
End: 2021-12-30

## 2021-12-30 NOTE — TELEPHONE ENCOUNTER
PATIENT IS REQUESTING A REFILL ON HER MIRALAX SHE STATES IT IS AN OLD SCRIPT THAT NEEDS RENEWED AND SENT TO HER PHARMACY   [address]   Please Approve or Refuse.   Send to Pharmacy per Pt's Request:     Next Visit Date:  Visit date not found   Last Visit Date: 11/15/2021    Hemoglobin A1C (%)   Date Value   02/17/2020 5.2   12/05/2018 5.3   03/29/2017 5.3             ( goal A1C is < 7)   BP Readings from Last 3 Encounters:   11/15/21 102/74   03/03/20 104/66   02/19/20 110/69          (goal 120/80)  BUN   Date Value Ref Range Status   10/31/2019 6 6 - 20 mg/dL Final     CREATININE   Date Value Ref Range Status   10/31/2019 0.62 0.50 - 0.90 mg/dL Final     Potassium   Date Value Ref Range Status   10/31/2019 3.8 3.7 - 5.3 mmol/L Final

## 2022-01-02 DIAGNOSIS — K59.04 CHRONIC IDIOPATHIC CONSTIPATION: ICD-10-CM

## 2022-01-02 RX ORDER — POLYETHYLENE GLYCOL 3350 17 G/17G
17 POWDER, FOR SOLUTION ORAL DAILY PRN
Qty: 1 EACH | Refills: 2 | Status: SHIPPED | OUTPATIENT
Start: 2022-01-02

## 2022-08-26 DIAGNOSIS — K21.9 GASTROESOPHAGEAL REFLUX DISEASE WITHOUT ESOPHAGITIS: ICD-10-CM

## 2022-08-26 RX ORDER — OMEPRAZOLE 20 MG/1
CAPSULE, DELAYED RELEASE ORAL
Qty: 30 CAPSULE | Refills: 3 | OUTPATIENT
Start: 2022-08-26

## 2023-03-14 ENCOUNTER — HOSPITAL ENCOUNTER (EMERGENCY)
Facility: CLINIC | Age: 28
Discharge: HOME | End: 2023-03-14
Attending: EMERGENCY MEDICINE
Payer: COMMERCIAL

## 2023-03-14 VITALS
HEIGHT: 59 IN | HEART RATE: 75 BPM | WEIGHT: 170 LBS | BODY MASS INDEX: 34.27 KG/M2 | SYSTOLIC BLOOD PRESSURE: 109 MMHG | TEMPERATURE: 98.1 F | OXYGEN SATURATION: 98 % | DIASTOLIC BLOOD PRESSURE: 79 MMHG | RESPIRATION RATE: 16 BRPM

## 2023-03-14 DIAGNOSIS — K08.89 DENTALGIA: Primary | ICD-10-CM

## 2023-03-14 PROCEDURE — 6360000002 HC RX W HCPCS: Performed by: REGISTERED NURSE

## 2023-03-14 PROCEDURE — 6370000000 HC RX 637 (ALT 250 FOR IP): Performed by: REGISTERED NURSE

## 2023-03-14 PROCEDURE — 99284 EMERGENCY DEPT VISIT MOD MDM: CPT

## 2023-03-14 PROCEDURE — 96372 THER/PROPH/DIAG INJ SC/IM: CPT

## 2023-03-14 RX ORDER — KETOROLAC TROMETHAMINE 30 MG/ML
30 INJECTION, SOLUTION INTRAMUSCULAR; INTRAVENOUS ONCE
Status: COMPLETED | OUTPATIENT
Start: 2023-03-14 | End: 2023-03-14

## 2023-03-14 RX ORDER — PENICILLIN V POTASSIUM 500 MG/1
500 TABLET ORAL 4 TIMES DAILY
Qty: 28 TABLET | Refills: 0 | Status: SHIPPED | OUTPATIENT
Start: 2023-03-14 | End: 2023-03-21

## 2023-03-14 RX ORDER — PENICILLIN V POTASSIUM 250 MG/1
500 TABLET ORAL ONCE
Status: COMPLETED | OUTPATIENT
Start: 2023-03-14 | End: 2023-03-14

## 2023-03-14 RX ADMIN — KETOROLAC TROMETHAMINE 30 MG: 30 INJECTION, SOLUTION INTRAMUSCULAR at 21:04

## 2023-03-14 RX ADMIN — PENICILLIN V POTASSIUM 500 MG: 250 TABLET, FILM COATED ORAL at 21:04

## 2023-03-14 ASSESSMENT — ENCOUNTER SYMPTOMS
COUGH: 0
FACIAL SWELLING: 0
SHORTNESS OF BREATH: 0
SORE THROAT: 0
VOMITING: 0
NAUSEA: 0
BACK PAIN: 0

## 2023-03-14 ASSESSMENT — PAIN SCALES - GENERAL: PAINLEVEL_OUTOF10: 10

## 2023-03-14 ASSESSMENT — PAIN - FUNCTIONAL ASSESSMENT: PAIN_FUNCTIONAL_ASSESSMENT: 0-10

## 2023-03-14 ASSESSMENT — PAIN DESCRIPTION - LOCATION: LOCATION: TEETH

## 2023-03-15 NOTE — ED TRIAGE NOTES
Patient states has been in excruciating pain for a week and a half.   Patient states her tooth broke her right back top tooth a month ago and is unable to get into the dentist until August.

## 2023-03-15 NOTE — ED PROVIDER NOTES
[facility]  [address] [facility]  Phone: [phone]        Pt Name: Mery Zavala  MRN: [id number]  [place] 1995  Date of evaluation: 3/14/23    CHIEFCOMPLAINT       Chief Complaint   Patient presents with    Dental Pain    Dental Injury     Patient states has been in excruciating pain for a week and a half. Patient states her tooth broke her right back top tooth a month ago and is unable to get into the dentist until August.        HISTORY OF PRESENT ILLNESS (Location/Symptom, Timing/Onset, Context/Setting, Quality, Duration, Modifying Factors, Severity)      Mery Zavala is a 28 y.o. female with no pertinent PMH who presents to the ED via private auto with right upper dental pain ongoing for the last week. Patient states she fractured tooth few months ago and does have intermittent issues. She states she contacted her dentist cannot be seen until August, did contact another dentist and she will be seeing beginning of April. She has any fevers or chills and states she able to eat and drink. She has taken Tylenol with no relief of symptoms earlier. Arrival she is resting on the cot comfortably with even unlabored breaths is nontoxic-appearing with no acute distress noted. PAST MEDICAL / SURGICAL / SOCIAL / FAMILY HISTORY     PMH:  has a past medical history of Acne vulgaris, Acute midline low back pain without sciatica, ADHD (attention deficit hyperactivity disorder), Anxiety, Asthma, Breast tenderness, Class 2 obesity due to excess calories without serious comorbidity with body mass index (BMI) of 35.0 to 35.9 in adult, Depression, DUB (dysfunctional uterine bleeding), Family history of breast cancer, GERD (gastroesophageal reflux disease), Headache(784.0), Herpes labialis, IBS with constipation/diarrhea, Menometrorrhagia, Mild intermittent asthma without complication, Underweight, and Vision abnormalities.   Surgical History:  has a past surgical history that includes Tonsillectomy and Upper gastrointestinal endoscopy (12). Social History:  reports that she quit smoking about 10 years ago. She has never used smokeless tobacco. She reports that she does not drink alcohol and does not use drugs. Family History: She indicated that her mother is alive. She indicated that her father is alive. She indicated that the status of her brother is unknown. She indicated that her maternal grandmother is alive. She indicated that her maternal grandfather is alive. She indicated that her paternal grandmother is . She indicated that her paternal grandfather is . She indicated that the status of her paternal aunt is unknown.   family history includes Asthma in her brother; Breast Cancer in her paternal aunt; Cancer in her paternal grandfather and paternal grandmother; Depression in her father and mother; Diabetes in her maternal grandmother, paternal aunt, and paternal grandmother; Francois Baseman in her father; Osteoporosis in her father; Other in her brother; Ovarian Cancer in her paternal aunt; Thyroid Disease in her paternal aunt. Psychiatric History: None    Allergies: Bee venom    Home Medications:   Prior to Admission medications    Medication Sig Start Date End Date Taking?  Authorizing Provider   penicillin v potassium (VEETID) 500 MG tablet Take 1 tablet by mouth 4 times daily for 7 days 3/14/23 3/21/23 Yes ADAMA Macdonald CNP   polyethylene glycol (MIRALAX) 17 GM/SCOOP powder Take 17 g by mouth daily as needed (constipation) 22   Betty New MD   buPROPion [facility] SR) 100 MG extended release tablet Take 1 tablet by mouth 2 times daily 11/15/21   Betty New MD   omeprazole (PRILOSEC) 20 MG delayed release capsule take 1 capsule by mouth once daily 21   Betty New MD   albuterol sulfate  (90 Base) MCG/ACT inhaler Inhale 2 puffs into the lungs 4 times daily as needed for Wheezing 19   Betty New MD   Elastic Bandages & Supports (ACE KNEE BRACE HINGED) MISC Use daily to during knee pain 6/7/18   Ted Hanna MD   FLOVENT  MCG/ACT inhaler inhale 1 puff by mouth twice a day 3/26/18   Ted Hanna MD       REVIEW OF SYSTEMS  (2-9 systems for level 4, 10 ormore for level 5)      Review of Systems   Constitutional:  Negative for chills and fever. HENT:  Positive for dental problem. Negative for facial swelling and sore throat. Respiratory:  Negative for cough and shortness of breath. Cardiovascular:  Negative for chest pain. Gastrointestinal:  Negative for nausea and vomiting. Musculoskeletal:  Negative for back pain, neck pain and neck stiffness. All other systems negative except as marked. PHYSICAL EXAM  (up to 7 for level 4, 8 or more for level 5)      INITIAL VITALS:  height is 4' 11\" (1.499 m) and weight is 77.1 kg (170 lb). Her oral temperature is 98.1 °F (36.7 °C). Her blood pressure is 109/79 and her pulse is 75. Her respiration is 16 and oxygen saturation is 98%. Vital signs reviewed. Physical Exam  Constitutional:       General: She is not in acute distress. Appearance: Normal appearance. She is not ill-appearing or toxic-appearing. HENT:      Head: Normocephalic and atraumatic. Right Ear: Tympanic membrane, ear canal and external ear normal.      Nose: No congestion or rhinorrhea. Mouth/Throat:      Mouth: Mucous membranes are moist.      Pharynx: Oropharynx is clear. Neck:      Trachea: Phonation normal. No tracheal deviation. Musculoskeletal:      Cervical back: Neck supple. Skin:     General: Skin is warm and dry. Capillary Refill: Capillary refill takes less than 2 seconds. Neurological:      Mental Status: She is alert.    Psychiatric:         Mood and Affect: Mood normal.         Behavior: Behavior normal.         DIFFERENTIAL DIAGNOSIS / MDM     After my physical exam, I do believe patient's dental pain is related to a dental fracture and developing underlying infection. We will start patient on oral antibiotics, provide patient with Toradol for pain. We will also perform dental block. Patient tolerated dental block well. Urged patient to contact local dentist to see if she can be seen sooner. Strict return with any worsening or continued symptoms. Patient is agreement this plan at this time. All question concerns were answered at this time. At this time the patient is without objective evidence of an acute process requiring hospitalization or inpatient management. They have remained hemodynamically stable throughout their entire ED visit and are stable for discharge with outpatient follow-up. The patient understands that at this time there is no evidence for a more malignant underlying process, but the patient also understands that early in the process of an illness or injury, an emergency department workup can be falsely reassuring. Routine discharge counseling was given, and the patient understands that worsening, changing or persistent symptoms should prompt an immediate call or follow up with their primary physician or return to the emergency department. The importance of appropriate follow up was also discussed. I have reviewed the disposition diagnosis with the patient and or their family/guardian. I have answered their questions and given discharge instructions. They voiced understanding of these instructions and did not have any further questions or complaints. PLAN (LABS / IMAGING / EKG):  No orders of the defined types were placed in this encounter.       MEDICATIONS ORDERED:  Orders Placed This Encounter   Medications    ketorolac (TORADOL) injection 30 mg    penicillin v potassium (VEETID) tablet 500 mg     Order Specific Question:   Antimicrobial Indications     Answer:   Head and Neck Infection    penicillin v potassium (VEETID) 500 MG tablet     Sig: Take 1 tablet by mouth 4 times daily for 7 days     Dispense:  28 tablet     Refill:  0       Controlled Substances Monitoring:     DIAGNOSTIC RESULTS     EKG: All EKG's are interpreted by the Emergency Department Physician who either signs or Co-signs this chart in the absenceof a cardiologist.      RADIOLOGY: All images are read by the radiologist and their interpretations are reviewed.    No orders to display       No results found.    LABS:  No results found for this visit on 03/14/23.    EMERGENCY DEPARTMENT COURSE           Vitals:    Vitals:    03/14/23 2030   BP: 109/79   Pulse: 75   Resp: 16   Temp: 98.1 °F (36.7 °C)   TempSrc: Oral   SpO2: 98%   Weight: 77.1 kg (170 lb)   Height: 4' 11\" (1.499 m)     -------------------------  BP: 109/79, Temp: 98.1 °F (36.7 °C), Heart Rate: 75, Resp: 16      RE-EVALUATION:  See ED Course notes above.        CONSULTS:  None    PROCEDURES:    Dental Block Procedure Note    Indication: dental pain    Procedure: The patient was placed in the supine position. 1 cc of 0.5% Bupivacaine without epinephrine was injected to perform a  nerve block on the right side supra alveolar nerve.    The patient tolerated the procedure well.    Complications: None     FINAL IMPRESSION      1. Dentalgia          DISPOSITION / PLAN     CONDITION ON DISPOSITION:   Stable for discharge.     PATIENT REFERRED TO:  Ludmila Rankin MD  [address] [address] [phone]    Call in 1 day      [facility] ED  [address] [facility] [id number] [phone]    If symptoms worsen    DISCHARGE MEDICATIONS:  New Prescriptions    PENICILLIN V POTASSIUM (VEETID) 500 MG TABLET    Take 1 tablet by mouth 4 times daily for 7 days       ADAMA Arroyo CNP   Emergency Medicine Nurse Practitioner    (Please note that portions of this note were completed with a voice recognition program.  Efforts were made to edit the dictations but occasionally words aremis-transcribed.)       ADAMA Arroyo CNP  03/14/23 9003

## 2023-03-15 NOTE — DISCHARGE INSTRUCTIONS
Please understand that at this time there is no evidence for a more serious underlying process, but that early in the process of an illness or injury, an emergency department workup can be falsely reassuring. You should contact your family doctor within the next 48 hours for a follow up appointment    Angella Pineda!!!    From [facility] ([facility]) and [facility] Emergency Services    On behalf of the Emergency Department staff at [facility]), I would like to thank you for giving us the opportunity to address your health care needs and concerns. We hope that during your visit, our service was delivered in a professional and caring manner. Please keep [facility] ([facility]) in mind as we walk with you down the path to your own personal wellness. Please expect an automated text message or email from us so we can ask a few questions about your health and progress. Based on your answers, a clinician may call you back to offer help and instructions. Please understand that early in the process of an illness or injury, an emergency department workup can be falsely reassuring. If you notice any worsening, changing or persistent symptoms please call your family doctor or return to the ER immediately. Tell us how we did during your visit at [email or web address]   and let us know about your experience

## 2023-03-15 NOTE — ED PROVIDER NOTES
[facility] ED  [address] [facility]  Phone: [phone]      Attending Physician Attestation    I performed a history and physical examination of the patient and discussed management with the mid level provider. I reviewed the mid level provider's note and agree with the documented findings and plan of care. Any areas of disagreement are noted on the chart. I was personally present for the key portions of any procedures. I have documented in the chart those procedures where I was not present during the key portions. I have reviewed the emergency nurses triage note. I agree with the chief complaint, past medical history, past surgical history, allergies, medications, social and family history as documented unless otherwise noted below. Documentation of the HPI, Physical Exam and Medical Decision Making performed by mid level providers is based on my personal performance of the HPI, PE and MDM. For Physician Assistant/ Nurse Practitioner cases/documentation I have personally evaluated this patient and have completed at least one if not all key elements of the E/M (history, physical exam, and MDM). Additional findings are as noted. CHIEF COMPLAINT       Chief Complaint   Patient presents with    Dental Pain    Dental Injury     Patient states has been in excruciating pain for a week and a half. Patient states her tooth broke her right back top tooth a month ago and is unable to get into the dentist until August.          Branden Sanabria is a 28 y.o. female who presents for evaluation of abdominal pain. The patient reports that she has had a broken right upper molar for several months. Starting a few weeks ago she began having intermittent pain to the right upper molar and over the past few days her pain has been constant, progressive, sharp, stabbing, and radiates to the entire right side of her face.   She tried to get in with her dentist but cannot be seen until April 2023. The patient denies any new trauma to her tooth. She has been taking Tylenol without much improvement. She does not list any other provoking or palliating factors. The patient denies fever, chills, headache, vision changes, neck pain, back pain, chest pain, shortness of breath, abdominal pain, nausea, vomiting, urinary/bowel symptoms, focal weakness, numbness, tingling, recent injury or illness. PAST MEDICAL HISTORY    has a past medical history of Acne vulgaris, Acute midline low back pain without sciatica, ADHD (attention deficit hyperactivity disorder), Anxiety, Asthma, Breast tenderness, Class 2 obesity due to excess calories without serious comorbidity with body mass index (BMI) of 35.0 to 35.9 in adult, Depression, DUB (dysfunctional uterine bleeding), Family history of breast cancer, GERD (gastroesophageal reflux disease), Headache(784.0), Herpes labialis, IBS with constipation/diarrhea, Menometrorrhagia, Mild intermittent asthma without complication, Underweight, and Vision abnormalities. SURGICAL HISTORY      has a past surgical history that includes Tonsillectomy and Upper gastrointestinal endoscopy (8/8/12).     CURRENT MEDICATIONS       Discharge Medication List as of 3/14/2023  9:08 PM        CONTINUE these medications which have NOT CHANGED    Details   polyethylene glycol (MIRALAX) 17 GM/SCOOP powder Take 17 g by mouth daily as needed (constipation), Disp-1 each, R-2Normal      buPROPion (WELLBUTRIN SR) 100 MG extended release tablet Take 1 tablet by mouth 2 times daily, Disp-60 tablet, R-3Normal      omeprazole (PRILOSEC) 20 MG delayed release capsule take 1 capsule by mouth once daily, Disp-30 capsule, R-3Normal      albuterol sulfate  (90 Base) MCG/ACT inhaler Inhale 2 puffs into the lungs 4 times daily as needed for Wheezing, Disp-3 Inhaler, R-3Normal      Elastic Bandages & Supports (ACE KNEE BRACE HINGED) MISC Use daily to during knee pain, Disp-1 each, R-0Print      FLOVENT  MCG/ACT inhaler inhale 1 puff by mouth twice a day, Disp-12 g, R-3Normal             ALLERGIES     is allergic to bee venom. FAMILY HISTORY     She indicated that her mother is alive. She indicated that her father is alive. She indicated that the status of her brother is unknown. She indicated that her maternal grandmother is alive. She indicated that her maternal grandfather is alive. She indicated that her paternal grandmother is . She indicated that her paternal grandfather is . She indicated that the status of her paternal aunt is unknown.     family history includes Asthma in her brother; Breast Cancer in her paternal aunt; Cancer in her paternal grandfather and paternal grandmother; Depression in her father and mother; Diabetes in her maternal grandmother, paternal aunt, and paternal grandmother; Lenn Madhu in her father; Osteoporosis in her father; Other in her brother; Ovarian Cancer in her paternal aunt; Thyroid Disease in her paternal aunt. SOCIAL HISTORY      reports that she quit smoking about 10 years ago. She has never used smokeless tobacco. She reports that she does not drink alcohol and does not use drugs. PHYSICAL EXAM     INITIAL VITALS:  height is 4' 11\" (1.499 m) and weight is 77.1 kg (170 lb). Her oral temperature is 98.1 °F (36.7 °C). Her blood pressure is 109/79 and her pulse is 75. Her respiration is 16 and oxygen saturation is 98%. Heart regular rate and rhythm. Lungs clear to auscultation. Abdomen soft and nontender. Poor dentition. Multiple decaying teeth. Pain with palpation over tooth #1. No drainable abscess. Posterior oropharynx is clear without any erythema, edema, exudate or asymmetry. Uvula midline. No trismus or malocclusion. No pain to palpation over the frontal or maxillary sinuses. No mastoid tenderness. Able to handle secretions. Normal speech. Patent airway.       DIAGNOSTIC RESULTS     EKG: All EKG's are interpreted by the Emergency Department Physician who either signs or Co-signs this chart in the absence of a cardiologist.    none    RADIOLOGY:     none    LABS:  No results found for this visit on 03/14/23. none    EMERGENCY DEPARTMENT COURSE:   Vitals:    Vitals:    03/14/23 2030   BP: 109/79   Pulse: 75   Resp: 16   Temp: 98.1 °F (36.7 °C)   TempSrc: Oral   SpO2: 98%   Weight: 77.1 kg (170 lb)   Height: 4' 11\" (1.499 m)     -------------------------  BP: 109/79, Temp: 98.1 °F (36.7 °C), Heart Rate: 75, Resp: 16      PERTINENT ATTENDING PHYSICIAN COMMENTS:    The patient is a 25-year-old female who presents for evaluation of dental pain. Vital signs are stable. She has tenderness to palpation over a cracked tooth #1 without any drainable abscess. I suspect she has developed a dental infection and she was started on penicillin. Her pain was controlled with IM Toradol and with a dental block. The patient was instructed to take her antibiotic as prescribed and to follow-up with her dentist as soon as possible. She was instructed to return to the ER for worsening symptoms or any other concern. I instructed her to take ibuprofen or Tylenol as needed for pain. She was educated to stop smoking. The patient understands that at this time there is no evidence for a more malignant underlying process, but also understands that early in the process of an illness or injury, an emergency department workup can be falsely reassuring. Routine discharge counseling was given, and the patient understands that worsening, changing or persistent symptoms should prompt an immediate call or follow up with their primary physician or return to the emergency department. The importance of appropriate follow up was also discussed. I have reviewed the disposition diagnosis with the patient. I have answered their questions and given discharge instructions.   They voiced understanding of these instructions and did not have any further questions or complaints.       (Please note that portions of this note were completed with a voice recognition program.  Efforts were made to edit the dictations but occasionally words are mis-transcribed.)    Flor Kendrick DO, [facility]  Attending Emergency Medicine Physician       Flor Kendrick DO  03/14/23 3554

## 2023-10-18 ENCOUNTER — HOSPITAL ENCOUNTER (EMERGENCY)
Facility: CLINIC | Age: 28
Discharge: HOME | End: 2023-10-18
Attending: EMERGENCY MEDICINE
Payer: COMMERCIAL

## 2023-10-18 VITALS
HEIGHT: 59 IN | OXYGEN SATURATION: 98 % | RESPIRATION RATE: 14 BRPM | HEART RATE: 92 BPM | BODY MASS INDEX: 33.26 KG/M2 | WEIGHT: 165 LBS | DIASTOLIC BLOOD PRESSURE: 70 MMHG | SYSTOLIC BLOOD PRESSURE: 113 MMHG

## 2023-10-18 DIAGNOSIS — R60.0 BILATERAL LEG EDEMA: Primary | ICD-10-CM

## 2023-10-18 LAB
ANION GAP SERPL CALCULATED.3IONS-SCNC: 10 MMOL/L (ref 9–17)
BASOPHILS # BLD: 0.1 K/UL (ref 0–0.2)
BASOPHILS NFR BLD: 1 % (ref 0–2)
BUN SERPL-MCNC: 17 MG/DL (ref 6–20)
CALCIUM SERPL-MCNC: 9.4 MG/DL (ref 8.6–10.4)
CHLORIDE SERPL-SCNC: 104 MMOL/L (ref 98–107)
CO2 SERPL-SCNC: 27 MMOL/L (ref 20–31)
CREAT SERPL-MCNC: 0.6 MG/DL (ref 0.5–0.9)
EOSINOPHIL # BLD: 0.3 K/UL (ref 0–0.4)
EOSINOPHILS RELATIVE PERCENT: 3 % (ref 1–4)
ERYTHROCYTE [DISTWIDTH] IN BLOOD BY AUTOMATED COUNT: 13.7 % (ref 12.5–15.4)
GFR SERPL CREATININE-BSD FRML MDRD: >60 ML/MIN/1.73M2
GLUCOSE SERPL-MCNC: 92 MG/DL (ref 70–99)
HCT VFR BLD AUTO: 40.1 % (ref 36–46)
HGB BLD-MCNC: 13.5 G/DL (ref 12–16)
LYMPHOCYTES NFR BLD: 3.3 K/UL (ref 1–4.8)
LYMPHOCYTES RELATIVE PERCENT: 31 % (ref 24–44)
MCH RBC QN AUTO: 27.4 PG (ref 26–34)
MCHC RBC AUTO-ENTMCNC: 33.5 G/DL (ref 31–37)
MCV RBC AUTO: 81.9 FL (ref 80–100)
MONOCYTES NFR BLD: 0.6 K/UL (ref 0.1–1.2)
MONOCYTES NFR BLD: 6 % (ref 2–11)
NEUTROPHILS NFR BLD: 59 % (ref 36–66)
NEUTS SEG NFR BLD: 6.3 K/UL (ref 1.8–7.7)
PLATELET # BLD AUTO: 287 K/UL (ref 140–450)
PMV BLD AUTO: 7.6 FL (ref 6–12)
POTASSIUM SERPL-SCNC: 4 MMOL/L (ref 3.7–5.3)
RBC # BLD AUTO: 4.9 M/UL (ref 4–5.2)
SODIUM SERPL-SCNC: 141 MMOL/L (ref 135–144)
WBC OTHER # BLD: 10.5 K/UL (ref 3.5–11)

## 2023-10-18 PROCEDURE — 80048 BASIC METABOLIC PNL TOTAL CA: CPT

## 2023-10-18 PROCEDURE — 99283 EMERGENCY DEPT VISIT LOW MDM: CPT

## 2023-10-18 PROCEDURE — 36415 COLL VENOUS BLD VENIPUNCTURE: CPT

## 2023-10-18 PROCEDURE — 85025 COMPLETE CBC W/AUTO DIFF WBC: CPT

## 2023-10-18 RX ORDER — ESCITALOPRAM OXALATE 10 MG/1
10 TABLET ORAL DAILY
COMMUNITY

## 2023-10-18 ASSESSMENT — ENCOUNTER SYMPTOMS
ABDOMINAL PAIN: 0
NAUSEA: 0
SHORTNESS OF BREATH: 0
VOMITING: 0
COLOR CHANGE: 1
COUGH: 0
DIARRHEA: 0
BACK PAIN: 0

## 2023-10-18 ASSESSMENT — PAIN SCALES - GENERAL: PAINLEVEL_OUTOF10: 0

## 2023-10-18 ASSESSMENT — PAIN - FUNCTIONAL ASSESSMENT: PAIN_FUNCTIONAL_ASSESSMENT: 0-10

## 2023-10-18 NOTE — DISCHARGE INSTRUCTIONS
Please follow-up with PCP within 1 day. Please wear compression stockings as discussed. You take Tylenol and Profen as needed for pain. Return with any worsening or continued symptoms. Please understand that at this time there is no evidence for a more serious underlying process, but that early in the process of an illness or injury, an emergency department workup can be falsely reassuring. You should contact your family doctor within the next 48 hours for a follow up appointment    [address] [facility]!!!    From [facility] ([facility]) and [facility] Emergency Services    On behalf of the Emergency Department staff at [facility]), I would like to thank you for giving us the opportunity to address your health care needs and concerns. We hope that during your visit, our service was delivered in a professional and caring manner. Please keep [facility] ([facility]) in mind as we walk with you down the path to your own personal wellness. Please expect an automated text message or email from us so we can ask a few questions about your health and progress. Based on your answers, a clinician may call you back to offer help and instructions. Please understand that early in the process of an illness or injury, an emergency department workup can be falsely reassuring. If you notice any worsening, changing or persistent symptoms please call your family doctor or return to the ER immediately. Tell us how we did during your visit at [email or web address]   and let us know about your experience

## 2023-10-18 NOTE — ED PROVIDER NOTES
[facility]  [address]  Phone: [phone]        Pt Name: Mars Parson  MRN: [id number]  [address] [facility] 1995  Date of evaluation: 10/18/23    [address]       Chief Complaint   Patient presents with    Leg Swelling     States having edema and frequent bruising to the legs x1 week, no prior hx, no pain, states the left is worse than right        HISTORY OF PRESENT ILLNESS (Location/Symptom, Timing/Onset, Context/Setting, Quality, Duration, Modifying Factors, Severity)      Mars Parson is a 33 y.o. female with no pertinent PMH who presents to the ED via private auto with bilateral lower leg swelling ongoing for approximately 1 week. Patient dates she has a new job where she is on her feet more often, does have to wear steel toe boots as well. Patient also states he is on her menses. She denies any pain. She does states she has noticed some irregular bruising but denies any known injuries. She denies any headache, dizziness, shortness of breath, difficulty breathing, abdominal pain nausea vomiting or diarrhea. She has not taken medications for symptoms. States nothing makes her symptoms better or worse. On arrival she is rest on the cot comfortably with even unlabored breaths nontoxic-appearing no acute distress noted. PAST MEDICAL / SURGICAL / SOCIAL / FAMILY HISTORY     PMH:  has a past medical history of Acne vulgaris, Acute midline low back pain without sciatica, ADHD (attention deficit hyperactivity disorder), Anxiety, Asthma, Breast tenderness, Class 2 obesity due to excess calories without serious comorbidity with body mass index (BMI) of 35.0 to 35.9 in adult, Depression, DUB (dysfunctional uterine bleeding), Family history of breast cancer, GERD (gastroesophageal reflux disease), Headache(784.0), Herpes labialis, IBS with constipation/diarrhea, Menometrorrhagia, Mild intermittent asthma without complication, Underweight, and Vision abnormalities.   Surgical

## 2023-10-18 NOTE — ED PROVIDER NOTES
[id number] [facility] ED  eMERGENCY dEPARTMENT eNCOUnter   Independent Attestation     Pt Name: Hannah Shine  MRN: [id number]  [address] [facility] 1995  Date of evaluation: 10/18/23       Hannah Shine is a 33 y.o. female who presents with Leg Swelling (States having edema and frequent bruising to the legs x1 week, no prior hx, no pain, states the left is worse than right )        Based on the medical record, the care appears appropriate. I was personally available for consultation in the Emergency Department.     Jayden Cortez DO  Attending Emergency  Physician                Jayden Cortez DO  10/18/23 [id number]

## 2023-10-20 ENCOUNTER — TELEPHONE (OUTPATIENT)
Dept: FAMILY MEDICINE CLINIC | Age: 28
End: 2023-10-20

## 2023-10-20 NOTE — TELEPHONE ENCOUNTER
[facility] ([facility]) ED Follow up Call    Reason for ED visit:  EDEMA       FU appts/Provider:    No future appointments. VOICEMAIL DOCUMENTATION - ERASE IF NOT USED  Hi, this message is for  CRISTINO  This is DENSIE from [facility] office. Just calling to see how you are doing after your recent visit to the Emergency Room. [facility] Moct[facility]uma wants to make sure you were able to fill any prescriptions and that you understand your discharge instructions. Please return our call if you need to make a follow up appointment with your provider or have any further needs. Our phone number is [phone]*. Have a great day.

## 2023-12-09 ENCOUNTER — HOSPITAL ENCOUNTER (OUTPATIENT)
Age: 28
Discharge: HOME | End: 2023-12-09
Payer: COMMERCIAL

## 2023-12-09 LAB
ALBUMIN SERPL-MCNC: 3.9 G/DL (ref 3.5–5.2)
ALP SERPL-CCNC: 69 U/L (ref 35–104)
ALT SERPL-CCNC: 11 U/L (ref 5–33)
ANION GAP SERPL CALCULATED.3IONS-SCNC: 8 MMOL/L (ref 9–17)
AST SERPL-CCNC: 14 U/L
BASOPHILS # BLD: 0.1 K/UL (ref 0–0.2)
BASOPHILS NFR BLD: 1 % (ref 0–2)
BILIRUB SERPL-MCNC: 0.4 MG/DL (ref 0.3–1.2)
BUN SERPL-MCNC: 12 MG/DL (ref 6–20)
CALCIUM SERPL-MCNC: 9 MG/DL (ref 8.6–10.4)
CHLORIDE SERPL-SCNC: 105 MMOL/L (ref 98–107)
CHOLEST SERPL-MCNC: 161 MG/DL
CHOLESTEROL/HDL RATIO: 4.1
CO2 SERPL-SCNC: 25 MMOL/L (ref 20–31)
CREAT SERPL-MCNC: 0.6 MG/DL (ref 0.5–0.9)
EOSINOPHIL # BLD: 0.2 K/UL (ref 0–0.4)
EOSINOPHILS RELATIVE PERCENT: 2 % (ref 0–4)
ERYTHROCYTE [DISTWIDTH] IN BLOOD BY AUTOMATED COUNT: 13.7 % (ref 11.5–14.9)
GFR SERPL CREATININE-BSD FRML MDRD: >60 ML/MIN/1.73M2
GLUCOSE SERPL-MCNC: 95 MG/DL (ref 70–99)
HCT VFR BLD AUTO: 41.2 % (ref 36–46)
HDLC SERPL-MCNC: 39 MG/DL
HGB BLD-MCNC: 13.5 G/DL (ref 12–16)
LDLC SERPL CALC-MCNC: 107 MG/DL (ref 0–130)
LYMPHOCYTES NFR BLD: 2.2 K/UL (ref 1–4.8)
LYMPHOCYTES RELATIVE PERCENT: 28 % (ref 24–44)
MCH RBC QN AUTO: 27.5 PG (ref 26–34)
MCHC RBC AUTO-ENTMCNC: 32.9 G/DL (ref 31–37)
MCV RBC AUTO: 83.5 FL (ref 80–100)
MONOCYTES NFR BLD: 0.5 K/UL (ref 0.1–1.3)
MONOCYTES NFR BLD: 6 % (ref 1–7)
NEUTROPHILS NFR BLD: 63 % (ref 36–66)
NEUTS SEG NFR BLD: 5 K/UL (ref 1.3–9.1)
PLATELET # BLD AUTO: 237 K/UL (ref 150–450)
PMV BLD AUTO: 8.2 FL (ref 6–12)
POTASSIUM SERPL-SCNC: 4.5 MMOL/L (ref 3.7–5.3)
PROT SERPL-MCNC: 6.9 G/DL (ref 6.4–8.3)
RBC # BLD AUTO: 4.93 M/UL (ref 4–5.2)
SODIUM SERPL-SCNC: 138 MMOL/L (ref 135–144)
T4 FREE SERPL-MCNC: 1.1 NG/DL (ref 0.9–1.7)
TRIGL SERPL-MCNC: 75 MG/DL
TSH SERPL DL<=0.05 MIU/L-ACNC: 0.97 UIU/ML (ref 0.3–5)
WBC OTHER # BLD: 7.9 K/UL (ref 3.5–11)

## 2023-12-09 PROCEDURE — 84439 ASSAY OF FREE THYROXINE: CPT

## 2023-12-09 PROCEDURE — 82728 ASSAY OF FERRITIN: CPT

## 2023-12-09 PROCEDURE — 84443 ASSAY THYROID STIM HORMONE: CPT

## 2023-12-09 PROCEDURE — 83540 ASSAY OF IRON: CPT

## 2023-12-09 PROCEDURE — 83550 IRON BINDING TEST: CPT

## 2023-12-09 PROCEDURE — 82746 ASSAY OF FOLIC ACID SERUM: CPT

## 2023-12-09 PROCEDURE — 83036 HEMOGLOBIN GLYCOSYLATED A1C: CPT

## 2023-12-09 PROCEDURE — 80061 LIPID PANEL: CPT

## 2023-12-09 PROCEDURE — 82607 VITAMIN B-12: CPT

## 2023-12-09 PROCEDURE — 82306 VITAMIN D 25 HYDROXY: CPT

## 2023-12-09 PROCEDURE — 85025 COMPLETE CBC W/AUTO DIFF WBC: CPT

## 2023-12-09 PROCEDURE — 36415 COLL VENOUS BLD VENIPUNCTURE: CPT

## 2023-12-09 PROCEDURE — 80053 COMPREHEN METABOLIC PANEL: CPT

## 2023-12-10 LAB
EST. AVERAGE GLUCOSE BLD GHB EST-MCNC: 94 MG/DL
HBA1C MFR BLD: 4.9 % (ref 4–6)

## 2023-12-11 ENCOUNTER — HOSPITAL ENCOUNTER (EMERGENCY)
Facility: CLINIC | Age: 28
Discharge: HOME | End: 2023-12-11
Attending: EMERGENCY MEDICINE
Payer: COMMERCIAL

## 2023-12-11 VITALS
SYSTOLIC BLOOD PRESSURE: 128 MMHG | OXYGEN SATURATION: 99 % | HEIGHT: 59 IN | WEIGHT: 165 LBS | TEMPERATURE: 98.3 F | BODY MASS INDEX: 33.26 KG/M2 | DIASTOLIC BLOOD PRESSURE: 67 MMHG | RESPIRATION RATE: 16 BRPM | HEART RATE: 79 BPM

## 2023-12-11 DIAGNOSIS — U07.1 COVID-19: Primary | ICD-10-CM

## 2023-12-11 LAB
FLUAV AG SPEC QL: NEGATIVE
FLUBV AG SPEC QL: NEGATIVE
SARS-COV-2 RDRP RESP QL NAA+PROBE: DETECTED
SPECIMEN DESCRIPTION: ABNORMAL

## 2023-12-11 PROCEDURE — 87635 SARS-COV-2 COVID-19 AMP PRB: CPT

## 2023-12-11 PROCEDURE — 87804 INFLUENZA ASSAY W/OPTIC: CPT

## 2023-12-11 PROCEDURE — 99283 EMERGENCY DEPT VISIT LOW MDM: CPT

## 2023-12-11 ASSESSMENT — PAIN - FUNCTIONAL ASSESSMENT: PAIN_FUNCTIONAL_ASSESSMENT: NONE - DENIES PAIN

## 2023-12-11 NOTE — DISCHARGE INSTRUCTIONS
Isolate at home. May use over-the-counter cold remedies as directed. Continue medications as directed. Return for difficulty breathing or if worse in any way. Please understand that at this time there is no evidence for a more serious underlying process, but that early in the process of an illness or injury, an emergency department workup can be falsely reassuring. You should contact your family doctor within the next 48 hours for a follow up appointment    [address]!!!    From [facility] ([facility]) and [facility] Emergency Services    On behalf of the Emergency Department staff at [facility]), I would like to thank you for giving us the opportunity to address your health care needs and concerns. We hope that during your visit, our service was delivered in a professional and caring manner. Please keep [facility] ([facility]) in mind as we walk with you down the path to your own personal wellness. Please expect an automated text message or email from us so we can ask a few questions about your health and progress. Based on your answers, a clinician may call you back to offer help and instructions. Please understand that early in the process of an illness or injury, an emergency department workup can be falsely reassuring. If you notice any worsening, changing or persistent symptoms please call your family doctor or return to the ER immediately. Tell us how we did during your visit at [email or web address]   and let us know about your experience

## 2023-12-13 ENCOUNTER — TELEPHONE (OUTPATIENT)
Dept: FAMILY MEDICINE CLINIC | Age: 28
End: 2023-12-13

## 2023-12-13 LAB
25(OH)D3 SERPL-MCNC: 13.8 NG/ML (ref 30–100)
FOLATE SERPL-MCNC: 10.4 NG/ML (ref 4.8–24.2)
VIT B12 SERPL-MCNC: 406 PG/ML (ref 232–1245)

## 2023-12-13 NOTE — TELEPHONE ENCOUNTER
[facility]) ED Follow up Call    Reason for ED visit:  [place] , this is JENNYFER from Ludmila Sánchez's office, just calling to see how you are doing after your recent ED visit. Did you receive discharge instructions? Yes  Do you understand the discharge instructions? Yes  Did the ED give you any new prescriptions? No: NONE GIVEN  Were you able to fill your prescriptions? No: NONE GIVEN      Do you have one of our red, yellow and green  Zone sheets that help you to determine when you should go to the ED? Not Applicable      Do you need or want to make a follow up appt with your PCP? Yes    Do you have any further needs in the home i.e. Equipment? No        FU appts/Provider:    No future appointments.

## 2023-12-14 DIAGNOSIS — K21.9 GASTROESOPHAGEAL REFLUX DISEASE WITHOUT ESOPHAGITIS: ICD-10-CM

## 2023-12-14 LAB
FERRITIN SERPL-MCNC: 36 NG/ML (ref 13–150)
IRON SATN MFR SERPL: 30 % (ref 20–55)
IRON SERPL-MCNC: 116 UG/DL (ref 37–145)
TIBC SERPL-MCNC: 388 UG/DL (ref 250–450)
UNSATURATED IRON BINDING CAPACITY: 272 UG/DL (ref 112–347)

## 2023-12-15 ENCOUNTER — TELEMEDICINE (OUTPATIENT)
Dept: FAMILY MEDICINE CLINIC | Age: 28
End: 2023-12-15
Payer: COMMERCIAL

## 2023-12-15 DIAGNOSIS — K21.9 GASTROESOPHAGEAL REFLUX DISEASE WITHOUT ESOPHAGITIS: ICD-10-CM

## 2023-12-15 DIAGNOSIS — Z11.59 ENCOUNTER FOR SCREENING FOR OTHER VIRAL DISEASES: ICD-10-CM

## 2023-12-15 DIAGNOSIS — U07.1 COVID-19 VIRUS INFECTION: Primary | ICD-10-CM

## 2023-12-15 DIAGNOSIS — E55.9 VITAMIN D DEFICIENCY: ICD-10-CM

## 2023-12-15 DIAGNOSIS — J45.20 MILD INTERMITTENT ASTHMA WITHOUT COMPLICATION: ICD-10-CM

## 2023-12-15 DIAGNOSIS — B00.2 ORAL HERPES SIMPLEX INFECTION: ICD-10-CM

## 2023-12-15 DIAGNOSIS — F32.4 MAJOR DEPRESSIVE DISORDER WITH SINGLE EPISODE, IN PARTIAL REMISSION (HCC): ICD-10-CM

## 2023-12-15 DIAGNOSIS — A60.04 HERPES SIMPLEX VULVOVAGINITIS: ICD-10-CM

## 2023-12-15 PROCEDURE — G8427 DOCREV CUR MEDS BY ELIG CLIN: HCPCS | Performed by: FAMILY MEDICINE

## 2023-12-15 PROCEDURE — 99214 OFFICE O/P EST MOD 30 MIN: CPT | Performed by: FAMILY MEDICINE

## 2023-12-15 RX ORDER — VALACYCLOVIR HYDROCHLORIDE 1 G/1
1000 TABLET, FILM COATED ORAL 2 TIMES DAILY
Qty: 60 TABLET | Refills: 0 | Status: SHIPPED | OUTPATIENT
Start: 2023-12-15

## 2023-12-15 RX ORDER — ACYCLOVIR 50 MG/G
OINTMENT TOPICAL
Qty: 1 EACH | Refills: 1 | Status: SHIPPED | OUTPATIENT
Start: 2023-12-15 | End: 2023-12-22

## 2023-12-15 RX ORDER — ERGOCALCIFEROL 1.25 MG/1
50000 CAPSULE ORAL WEEKLY
Qty: 12 CAPSULE | Refills: 1 | Status: SHIPPED | OUTPATIENT
Start: 2023-12-15

## 2023-12-15 RX ORDER — OMEPRAZOLE 20 MG/1
20 CAPSULE, DELAYED RELEASE ORAL DAILY
Qty: 30 CAPSULE | Refills: 3 | OUTPATIENT
Start: 2023-12-15

## 2023-12-15 RX ORDER — OMEPRAZOLE 20 MG/1
20 CAPSULE, DELAYED RELEASE ORAL DAILY
Qty: 30 CAPSULE | Refills: 3 | Status: SHIPPED | OUTPATIENT
Start: 2023-12-15

## 2023-12-15 ASSESSMENT — PATIENT HEALTH QUESTIONNAIRE - PHQ9
6. FEELING BAD ABOUT YOURSELF - OR THAT YOU ARE A FAILURE OR HAVE LET YOURSELF OR YOUR FAMILY DOWN: 0
7. TROUBLE CONCENTRATING ON THINGS, SUCH AS READING THE NEWSPAPER OR WATCHING TELEVISION: 0
SUM OF ALL RESPONSES TO PHQ QUESTIONS 1-9: 6
1. LITTLE INTEREST OR PLEASURE IN DOING THINGS: 1
SUM OF ALL RESPONSES TO PHQ QUESTIONS 1-9: 6
10. IF YOU CHECKED OFF ANY PROBLEMS, HOW DIFFICULT HAVE THESE PROBLEMS MADE IT FOR YOU TO DO YOUR WORK, TAKE CARE OF THINGS AT HOME, OR GET ALONG WITH OTHER PEOPLE: 1
SUM OF ALL RESPONSES TO PHQ9 QUESTIONS 1 & 2: 3
SUM OF ALL RESPONSES TO PHQ QUESTIONS 1-9: 6
9. THOUGHTS THAT YOU WOULD BE BETTER OFF DEAD, OR OF HURTING YOURSELF: 0
3. TROUBLE FALLING OR STAYING ASLEEP: 1
2. FEELING DOWN, DEPRESSED OR HOPELESS: 2
SUM OF ALL RESPONSES TO PHQ QUESTIONS 1-9: 6
5. POOR APPETITE OR OVEREATING: 0
4. FEELING TIRED OR HAVING LITTLE ENERGY: 1
8. MOVING OR SPEAKING SO SLOWLY THAT OTHER PEOPLE COULD HAVE NOTICED. OR THE OPPOSITE, BEING SO FIGETY OR RESTLESS THAT YOU HAVE BEEN MOVING AROUND A LOT MORE THAN USUAL: 1

## 2023-12-15 NOTE — PROGRESS NOTES
Pulmonary/Chest: [x] Respiratory effort normal   [x] No visualized signs of difficulty breathing or respiratory distress        [] Abnormal -      Musculoskeletal:   [x] Normal gait with no signs of ataxia         [x] Normal range of motion of neck        [] Abnormal -     Neurological:        [x] No Facial Asymmetry (Cranial nerve 7 motor function) (limited exam due to video visit)          [x] No gaze palsy        [] Abnormal -          Skin:        [x] No significant exanthematous lesions or discoloration noted on facial skin         [] Abnormal -            Psychiatric:       [x] Normal Affect [] Abnormal -        [x] No Hallucinations    Other pertinent observable physical exam findings:-         On this date 12/15/2023 I have spent 35 minutes reviewing previous notes, test results and face to face (virtual) with the patient discussing the diagnosis and importance of compliance with the treatment plan as well as documenting on the day of the visit.     --Tato Shanks MD

## 2023-12-15 NOTE — TELEPHONE ENCOUNTER
Please Approve or Refuse.  Send to Pharmacy per Pt's Request:      Next Visit Date:  12/15/2023   Last Visit Date: 11/15/2021    Hemoglobin A1C (%)   Date Value   12/09/2023 4.9   02/17/2020 5.2   12/05/2018 5.3             ( goal A1C is < 7)   BP Readings from Last 3 Encounters:   12/11/23 128/67   10/18/23 113/70   03/14/23 109/79          (goal 120/80)  BUN   Date Value Ref Range Status   12/09/2023 12 6 - 20 mg/dL Final     Creatinine   Date Value Ref Range Status   12/09/2023 0.6 0.5 - 0.9 mg/dL Final     Potassium   Date Value Ref Range Status   12/09/2023 4.5 3.7 - 5.3 mmol/L Final

## 2024-01-15 ASSESSMENT — ENCOUNTER SYMPTOMS
CHEST TIGHTNESS: 0
VOMITING: 0
CONSTIPATION: 0
BACK PAIN: 0
SHORTNESS OF BREATH: 0
SORE THROAT: 0
ABDOMINAL PAIN: 0
DIARRHEA: 0
NAUSEA: 0
COUGH: 0
ABDOMINAL DISTENTION: 0
RHINORRHEA: 0

## 2024-01-15 NOTE — PATIENT INSTRUCTIONS
- Health Maintenance Recommendations  Exercise   I generally recommend that people of all ages try to get 150 minutes of physical activity per week and it doesn’t matter how this totals up, in other words 30 minutes 5 days per week is as good as 50 minutes 3 days a week and so on.    The level of activity should be such that it is able to get your heart rate up to 100 or more, for example a brisk walk should achieve this rate.   Dietary Recommendations  In terms of diet, I generally recommend trying to eat a healthy well balanced diet full of fruits and vegetables. Avoid carbonated drinks and fruit juices and limit your alcohol use.   Avoid processed foods wherever possible (anything that comes in a can or a box) which can be achieved by sticking to the outside walls of the grocery store where generally you will find fresh fruits/vegetables, meats, dairy, and frozen foods.    Try to avoid starches in the diet where possible and minimize bread, rice, potatoes, and pasta in the diet.  Specifically try to avoid gluten, which even in people that don’t have a noé allergy, causes havoc in the small intestine and alters absorption of nutrients which can in turn lead to obesity.   Sleep  Try to achieve a regular sleep schedule, waking and laying down at the same time each night.  Most people need 7 hours per night plus or minus 2 hours.    You will know that you’re getting enough because you will wake feeling refreshed and not need to sleep in to catch up on weekends.   Skin Care  Make sure that you don’t neglect your skin.    Play it safe in the sun. Use a sunblock on all of your exposed skin.   The sunblock should be broad spectrum and water resistant.    I do recommend an SPF 30 or higher sun screen any time that you plan to be in the sun for more than 20 minutes, even in the winter or on cloudy days (keep in mind that UV light penetrates clouds and can cause burns even on cloudy days).   Apply 20 to 30 minutes before

## 2024-01-15 NOTE — PROGRESS NOTES
Kristal Rhoades, APRN-CNP  [facility]  [facility] MEDICINE  [address], [address]  [place] [facility] [address]  Dept: [phone]  Dept Fax: [phone]    Patient ID: Kwan Saucedo is a 28 y.o. female.    HPI:  Kwan Saucedo is a 28 y.o. female who presents to the office today for a first visit and to establish a relationship with a new primary care provider.  Today, the patient has multiple complaints. She relates that sometimes she feels like food gets stuck in her throat. She will vomit undigested food. She did see a gastroenterologist many years ago but would like to see someone else. She does take Omeprazole 20 mg daily. Sometimes, she takes 40 mg daily. She also complains of ongoing fatigue. She does not want to do anything. She comes home from work and does not have any energy. She is not sure if it is mood related or if there is something wrong. She did recently have labs drawn by her previous PCP and it did show a significantly low vitamin D level. She was given high dose vitamin D weekly but she misread the bottle and took one 3 days in a row. Pt denies any fever or chills.  Pt today denies any HA, chest pain, or SOB.  Pt denies any N/V/D/C or abdominal pain.        Was on Lexapro which mad her mood worse. Was also on Wellbutrin, made worese. She was on Zoloft and it made her more tired. Celexa back in 2012 and Cymbalta    Previous office notes, labs and diagnostic studies were reviewed prior to and during encounter.  The patient's past medical, surgical, social, and family history as well as her current medications and allergies were reviewed as documented in today's encounter by PATRICIA Gee.      Current Outpatient Medications on File Prior to Visit   Medication Sig Dispense Refill    omeprazole (PRILOSEC) 20 MG delayed release capsule Take 1 capsule by mouth daily 30 capsule 3    vitamin D (ERGOCALCIFEROL) 1.25 MG (83731 UT) CAPS capsule Take 1 capsule by

## 2024-01-16 ENCOUNTER — OFFICE VISIT (OUTPATIENT)
Dept: FAMILY MEDICINE CLINIC | Age: 29
End: 2024-01-16
Payer: COMMERCIAL

## 2024-01-16 VITALS
OXYGEN SATURATION: 98 % | HEART RATE: 94 BPM | BODY MASS INDEX: 33.47 KG/M2 | DIASTOLIC BLOOD PRESSURE: 72 MMHG | WEIGHT: 166 LBS | HEIGHT: 59 IN | TEMPERATURE: 98.5 F | SYSTOLIC BLOOD PRESSURE: 118 MMHG

## 2024-01-16 DIAGNOSIS — K58.2 IRRITABLE BOWEL SYNDROME WITH BOTH CONSTIPATION AND DIARRHEA: ICD-10-CM

## 2024-01-16 DIAGNOSIS — Z76.89 ENCOUNTER TO ESTABLISH CARE: Primary | ICD-10-CM

## 2024-01-16 DIAGNOSIS — E55.9 VITAMIN D DEFICIENCY: ICD-10-CM

## 2024-01-16 DIAGNOSIS — K21.9 GASTROESOPHAGEAL REFLUX DISEASE WITHOUT ESOPHAGITIS: ICD-10-CM

## 2024-01-16 DIAGNOSIS — F32.4 MAJOR DEPRESSIVE DISORDER WITH SINGLE EPISODE, IN PARTIAL REMISSION (HCC): ICD-10-CM

## 2024-01-16 DIAGNOSIS — R13.10 DYSPHAGIA, UNSPECIFIED TYPE: ICD-10-CM

## 2024-01-16 DIAGNOSIS — J45.20 MILD INTERMITTENT ASTHMA WITHOUT COMPLICATION: ICD-10-CM

## 2024-01-16 DIAGNOSIS — F41.9 ANXIETY: ICD-10-CM

## 2024-01-16 DIAGNOSIS — Z13.31 POSITIVE DEPRESSION SCREENING: ICD-10-CM

## 2024-01-16 DIAGNOSIS — Z00.00 PREVENTATIVE HEALTH CARE: ICD-10-CM

## 2024-01-16 PROCEDURE — 99204 OFFICE O/P NEW MOD 45 MIN: CPT | Performed by: NURSE PRACTITIONER

## 2024-01-16 PROCEDURE — G8427 DOCREV CUR MEDS BY ELIG CLIN: HCPCS | Performed by: NURSE PRACTITIONER

## 2024-01-16 PROCEDURE — 1036F TOBACCO NON-USER: CPT | Performed by: NURSE PRACTITIONER

## 2024-01-16 PROCEDURE — G8484 FLU IMMUNIZE NO ADMIN: HCPCS | Performed by: NURSE PRACTITIONER

## 2024-01-16 PROCEDURE — G8417 CALC BMI ABV UP PARAM F/U: HCPCS | Performed by: NURSE PRACTITIONER

## 2024-01-16 RX ORDER — FLUOXETINE HYDROCHLORIDE 20 MG/1
20 CAPSULE ORAL DAILY
Qty: 30 CAPSULE | Refills: 0 | Status: SHIPPED | OUTPATIENT
Start: 2024-01-16

## 2024-01-16 SDOH — ECONOMIC STABILITY: FOOD INSECURITY: WITHIN THE PAST 12 MONTHS, YOU WORRIED THAT YOUR FOOD WOULD RUN OUT BEFORE YOU GOT MONEY TO BUY MORE.: NEVER TRUE

## 2024-01-16 SDOH — ECONOMIC STABILITY: FOOD INSECURITY: WITHIN THE PAST 12 MONTHS, THE FOOD YOU BOUGHT JUST DIDN'T LAST AND YOU DIDN'T HAVE MONEY TO GET MORE.: NEVER TRUE

## 2024-01-16 SDOH — ECONOMIC STABILITY: INCOME INSECURITY: HOW HARD IS IT FOR YOU TO PAY FOR THE VERY BASICS LIKE FOOD, HOUSING, MEDICAL CARE, AND HEATING?: NOT VERY HARD

## 2024-01-16 SDOH — ECONOMIC STABILITY: HOUSING INSECURITY
IN THE LAST 12 MONTHS, WAS THERE A TIME WHEN YOU DID NOT HAVE A STEADY PLACE TO SLEEP OR SLEPT IN A SHELTER (INCLUDING NOW)?: NO

## 2024-01-16 ASSESSMENT — PATIENT HEALTH QUESTIONNAIRE - PHQ9
SUM OF ALL RESPONSES TO PHQ QUESTIONS 1-9: 11
2. FEELING DOWN, DEPRESSED OR HOPELESS: 0
7. TROUBLE CONCENTRATING ON THINGS, SUCH AS READING THE NEWSPAPER OR WATCHING TELEVISION: 0
SUM OF ALL RESPONSES TO PHQ QUESTIONS 1-9: 11
4. FEELING TIRED OR HAVING LITTLE ENERGY: 3
6. FEELING BAD ABOUT YOURSELF - OR THAT YOU ARE A FAILURE OR HAVE LET YOURSELF OR YOUR FAMILY DOWN: 1
SUM OF ALL RESPONSES TO PHQ QUESTIONS 1-9: 11
9. THOUGHTS THAT YOU WOULD BE BETTER OFF DEAD, OR OF HURTING YOURSELF: 0
3. TROUBLE FALLING OR STAYING ASLEEP: 3
SUM OF ALL RESPONSES TO PHQ QUESTIONS 1-9: 11
10. IF YOU CHECKED OFF ANY PROBLEMS, HOW DIFFICULT HAVE THESE PROBLEMS MADE IT FOR YOU TO DO YOUR WORK, TAKE CARE OF THINGS AT HOME, OR GET ALONG WITH OTHER PEOPLE: 2
5. POOR APPETITE OR OVEREATING: 1
1. LITTLE INTEREST OR PLEASURE IN DOING THINGS: 3
8. MOVING OR SPEAKING SO SLOWLY THAT OTHER PEOPLE COULD HAVE NOTICED. OR THE OPPOSITE, BEING SO FIGETY OR RESTLESS THAT YOU HAVE BEEN MOVING AROUND A LOT MORE THAN USUAL: 0
SUM OF ALL RESPONSES TO PHQ9 QUESTIONS 1 & 2: 3

## 2024-02-04 DIAGNOSIS — F41.9 ANXIETY: ICD-10-CM

## 2024-02-04 DIAGNOSIS — F32.4 MAJOR DEPRESSIVE DISORDER WITH SINGLE EPISODE, IN PARTIAL REMISSION (HCC): ICD-10-CM

## 2024-02-05 RX ORDER — FLUOXETINE HYDROCHLORIDE 20 MG/1
20 CAPSULE ORAL DAILY
Qty: 90 CAPSULE | Refills: 1 | Status: SHIPPED | OUTPATIENT
Start: 2024-02-05

## 2024-02-16 ENCOUNTER — OFFICE VISIT (OUTPATIENT)
Dept: FAMILY MEDICINE CLINIC | Age: 29
End: 2024-02-16
Payer: COMMERCIAL

## 2024-02-16 VITALS
HEART RATE: 81 BPM | DIASTOLIC BLOOD PRESSURE: 62 MMHG | BODY MASS INDEX: 32.72 KG/M2 | OXYGEN SATURATION: 98 % | SYSTOLIC BLOOD PRESSURE: 98 MMHG | WEIGHT: 162 LBS

## 2024-02-16 DIAGNOSIS — F32.4 MAJOR DEPRESSIVE DISORDER WITH SINGLE EPISODE, IN PARTIAL REMISSION (HCC): ICD-10-CM

## 2024-02-16 DIAGNOSIS — F41.9 ANXIETY: Primary | ICD-10-CM

## 2024-02-16 PROCEDURE — G8484 FLU IMMUNIZE NO ADMIN: HCPCS | Performed by: NURSE PRACTITIONER

## 2024-02-16 PROCEDURE — G8417 CALC BMI ABV UP PARAM F/U: HCPCS | Performed by: NURSE PRACTITIONER

## 2024-02-16 PROCEDURE — 99213 OFFICE O/P EST LOW 20 MIN: CPT | Performed by: NURSE PRACTITIONER

## 2024-02-16 PROCEDURE — G8427 DOCREV CUR MEDS BY ELIG CLIN: HCPCS | Performed by: NURSE PRACTITIONER

## 2024-02-16 PROCEDURE — 1036F TOBACCO NON-USER: CPT | Performed by: NURSE PRACTITIONER

## 2024-02-16 RX ORDER — FLUOXETINE HYDROCHLORIDE 40 MG/1
40 CAPSULE ORAL DAILY
Qty: 90 CAPSULE | Refills: 1 | Status: SHIPPED | OUTPATIENT
Start: 2024-02-16

## 2024-02-16 NOTE — PROGRESS NOTES
lower leg: No edema.      Left lower leg: No edema.   Skin:     General: Skin is warm and dry.      Findings: No rash.   Neurological:      Mental Status: She is alert and oriented to person, place, and time.   Psychiatric:         Mood and Affect: Mood normal.         Behavior: Behavior is cooperative.       ASSESSMENT:   Diagnosis Orders   1. Anxiety  FLUoxetine (PROZAC) 40 MG capsule      2. Major Depressive Disorder  FLUoxetine (PROZAC) 40 MG capsule           PLAN:  1. Anxiety  2. Major Depressive Disorder  - Improving  - Will increase Prozac to 40 mg daily  - Has been on Lexapro, Wellbutrin, Celexa and Zoloft in the past  - Offered reassurance and allowed to ventilate feelings and ask questions.   - Continue to monitor for triggers of increase anxiety and/or stressors.   - Encouraged patient to express needs and concerns.   - Non-pharmological coping methods encouraged.    - Pt encouraged to deep breath and relax through anxious moments. Reassurance offered; Non-pharmological coping methods encouraged.      - Medications, labs, diagnostic studies, consultations and follow-up as documented in this encounter.     - On this date February 16, 2024,  I have spent greater than 50% of this visit reviewing previous notes, test results and/or face to face with the patient discussing the diagnoses, importance of compliance with the treatment plan, counseling, coordinating care as well as documenting on the day of the visit.     Electronically signed by ADAMA West NP on 2/16/2024 at 4:00 PM

## 2024-04-10 ENCOUNTER — TELEPHONE (OUTPATIENT)
Dept: FAMILY MEDICINE CLINIC | Age: 29
End: 2024-04-10

## 2024-04-10 ENCOUNTER — HOSPITAL ENCOUNTER (OUTPATIENT)
Age: 29
Discharge: HOME | End: 2024-04-10
Payer: COMMERCIAL

## 2024-04-10 DIAGNOSIS — N91.2 AMENORRHEA: Primary | ICD-10-CM

## 2024-04-10 DIAGNOSIS — N91.2 AMENORRHEA: ICD-10-CM

## 2024-04-10 LAB — B-HCG SERPL EIA 3RD IS-ACNC: <1 MIU/ML

## 2024-04-10 PROCEDURE — 84702 CHORIONIC GONADOTROPIN TEST: CPT

## 2024-04-10 PROCEDURE — 36415 COLL VENOUS BLD VENIPUNCTURE: CPT

## 2024-04-10 NOTE — TELEPHONE ENCOUNTER
Pt called in stating that she is pretty sure she is pregnant but all pregnancy tests are coming back negative so she is wondering if it is too early and if she could get a blood test done. Pt states she is nauseous, sore breast, tender under belly.

## 2024-05-08 ENCOUNTER — APPOINTMENT (OUTPATIENT)
Dept: GENERAL RADIOLOGY | Age: 29
End: 2024-05-08
Payer: COMMERCIAL

## 2024-05-08 ENCOUNTER — HOSPITAL ENCOUNTER (EMERGENCY)
Age: 29
Discharge: HOME | End: 2024-05-08
Attending: EMERGENCY MEDICINE
Payer: COMMERCIAL

## 2024-05-08 VITALS
OXYGEN SATURATION: 100 % | TEMPERATURE: 97.6 F | SYSTOLIC BLOOD PRESSURE: 101 MMHG | RESPIRATION RATE: 16 BRPM | WEIGHT: 156 LBS | DIASTOLIC BLOOD PRESSURE: 77 MMHG | HEART RATE: 87 BPM | HEIGHT: 59 IN | BODY MASS INDEX: 31.45 KG/M2

## 2024-05-08 DIAGNOSIS — J02.0 STREPTOCOCCAL SORE THROAT: Primary | ICD-10-CM

## 2024-05-08 DIAGNOSIS — S96.919A: ICD-10-CM

## 2024-05-08 LAB
FLUAV RNA RESP QL NAA+PROBE: NOT DETECTED
FLUBV RNA RESP QL NAA+PROBE: NOT DETECTED
SARS-COV-2 RNA RESP QL NAA+PROBE: NOT DETECTED
SOURCE: NORMAL
SPECIMEN DESCRIPTION: NORMAL
SPECIMEN SOURCE: ABNORMAL
STREP A, MOLECULAR: POSITIVE

## 2024-05-08 PROCEDURE — 87651 STREP A DNA AMP PROBE: CPT

## 2024-05-08 PROCEDURE — 73630 X-RAY EXAM OF FOOT: CPT

## 2024-05-08 PROCEDURE — 99284 EMERGENCY DEPT VISIT MOD MDM: CPT

## 2024-05-08 PROCEDURE — 87636 SARSCOV2 & INF A&B AMP PRB: CPT

## 2024-05-08 RX ORDER — AMOXICILLIN 875 MG/1
875 TABLET, COATED ORAL 2 TIMES DAILY
Qty: 20 TABLET | Refills: 0 | Status: SHIPPED | OUTPATIENT
Start: 2024-05-08 | End: 2024-05-18

## 2024-05-08 ASSESSMENT — PAIN - FUNCTIONAL ASSESSMENT: PAIN_FUNCTIONAL_ASSESSMENT: 0-10

## 2024-05-08 ASSESSMENT — PAIN DESCRIPTION - DESCRIPTORS: DESCRIPTORS: SHARP

## 2024-05-08 ASSESSMENT — PAIN DESCRIPTION - LOCATION: LOCATION: FOOT

## 2024-05-08 ASSESSMENT — PAIN DESCRIPTION - ORIENTATION: ORIENTATION: LEFT

## 2024-05-08 NOTE — ED PROVIDER NOTES
[facility] ED  eMERGENCY dEPARTMENTeNCOUnter      Pt Name: Kwan Saucedo  MRN: [id number]  Birthdate 1995  Date ofevaluation: 5/8/2024  Provider: Kobi Conroy PA-C    CHIEF COMPLAINT       Chief Complaint   Patient presents with    Pharyngitis     Pt reports sore throat since last night. Reports fevers/chills over the past few days. Generalized body aches.    Foot Pain     Pt reports pain to left foot for for the past year. Worsening over that time. Denies injury. States pain radiates up LLE.         HISTORY OF PRESENT ILLNESS  (Location/Symptom, Timing/Onset, Context/Setting, Quality, Duration, Modifying Factors, Severity.)   Kwan Saucedo is a 28 y.o. female who presents to the emergency department with sore throat since last night.  Reports fever chills and bodyaches.  Also reports left foot pain over the last year has worsened over the last couple months and has never been evaluated.  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      Nursing Notes were reviewed.    ALLERGIES     Bee venom    CURRENT MEDICATIONS       Previous Medications    ALBUTEROL SULFATE  (90 BASE) MCG/ACT INHALER    Inhale 2 puffs into the lungs 4 times daily as needed for Wheezing    ELASTIC BANDAGES & SUPPORTS (ACE KNEE BRACE HINGED) MISC    Use daily to during knee pain    FLOVENT  MCG/ACT INHALER    inhale 1 puff by mouth twice a day    FLUOXETINE (PROZAC) 40 MG CAPSULE    Take 1 capsule by mouth daily    OMEPRAZOLE (PRILOSEC) 20 MG DELAYED RELEASE CAPSULE    Take 1 capsule by mouth daily    POLYETHYLENE GLYCOL (MIRALAX) 17 GM/SCOOP POWDER    Take 17 g by mouth daily as needed (constipation)    VALACYCLOVIR (VALTREX) 1 G TABLET    Take 1 tablet by mouth 2 times daily    VITAMIN D (ERGOCALCIFEROL) 1.25 MG (50897 UT) CAPS CAPSULE    Take 1 capsule by mouth once a week       PAST MEDICAL HISTORY         Diagnosis Date    Acne vulgaris     Acute midline low back pain without sciatica

## 2024-05-08 NOTE — ED NOTES
Pt to er with c/o sore throat. Pt states sore throat started last night. Pt states she has had chills and body-aches for past few days. Pt also c/o left foot pain. Pt states she has had pain for past year. Pt denies known injury. Pt a&ox3. Skin warm and dry. Respirations even and non-labored.

## 2024-08-29 ENCOUNTER — TELEMEDICINE (OUTPATIENT)
Dept: FAMILY MEDICINE CLINIC | Age: 29
End: 2024-08-29
Payer: COMMERCIAL

## 2024-08-29 DIAGNOSIS — J02.9 SORE THROAT: Primary | ICD-10-CM

## 2024-08-29 PROCEDURE — 99213 OFFICE O/P EST LOW 20 MIN: CPT | Performed by: FAMILY MEDICINE

## 2024-08-29 PROCEDURE — G8427 DOCREV CUR MEDS BY ELIG CLIN: HCPCS | Performed by: FAMILY MEDICINE

## 2024-08-29 RX ORDER — AMOXICILLIN 875 MG
875 TABLET ORAL 2 TIMES DAILY
Qty: 20 TABLET | Refills: 0 | Status: SHIPPED | OUTPATIENT
Start: 2024-08-29 | End: 2024-09-08

## 2024-08-29 ASSESSMENT — ENCOUNTER SYMPTOMS
SHORTNESS OF BREATH: 0
ABDOMINAL PAIN: 0
DIARRHEA: 0
SORE THROAT: 1
SINUS PAIN: 0
RHINORRHEA: 0
CHEST TIGHTNESS: 0
BACK PAIN: 0
CONSTIPATION: 0
ABDOMINAL DISTENTION: 0
NAUSEA: 0
COUGH: 0
VOMITING: 0

## 2024-08-29 NOTE — PROGRESS NOTES
congestion, ear pain, rhinorrhea and sinus pain.    Eyes:  Negative for visual disturbance.   Respiratory:  Negative for cough, chest tightness and shortness of breath.    Cardiovascular:  Negative for chest pain and palpitations.   Gastrointestinal:  Negative for abdominal distention, abdominal pain, constipation, diarrhea, nausea and vomiting.   Genitourinary:  Negative for difficulty urinating, dysuria, frequency and urgency.   Musculoskeletal:  Negative for arthralgias, back pain, myalgias and neck pain.   Skin:  Negative for rash.   Neurological:  Negative for dizziness, weakness, light-headedness, numbness and headaches.   Hematological:  Negative for adenopathy.   Psychiatric/Behavioral:  Negative for behavioral problems, dysphoric mood and sleep disturbance. The patient is not nervous/anxious.        Objective:     Physical Exam  Vitals reviewed: Vital signs unavailable, as this is a virtual visit.   Constitutional:       General: She is not in acute distress.     Appearance: Normal appearance. She is not ill-appearing or toxic-appearing.   Pulmonary:      Effort: Pulmonary effort is normal. No tachypnea, accessory muscle usage or respiratory distress.      Comments: Patient able to talk in full sentences without difficulty   Neurological:      General: No focal deficit present.      Mental Status: She is alert and oriented to person, place, and time.   Psychiatric:         Mood and Affect: Mood normal.         Speech: Speech normal.         Behavior: Behavior normal. Behavior is cooperative.         Assessment:      Diagnosis Orders   1. Sore throat  amoxicillin (AMOXIL) 875 MG tablet          Plan:      Orders Placed This Encounter   Medications    amoxicillin (AMOXIL) 875 MG tablet     Sig: Take 1 tablet by mouth 2 times daily for 10 days     Dispense:  20 tablet     Refill:  0      Will start the Amoxicillin as prescribed    Stay hydrated and can take OTC Tylenol and Motrin prn     Pt instructed to call

## (undated) DEVICE — BITEBLOCK 54FR W/ DENT RIM BLOX

## (undated) DEVICE — DEFENDO AIR WATER SUCTION AND BIOPSY VALVE KIT FOR  OLYMPUS: Brand: DEFENDO AIR/WATER/SUCTION AND BIOPSY VALVE

## (undated) DEVICE — CANNULA NSL AD TBNG L7FT PVC STR NONFLARED PRNG O2 DEL W STD

## (undated) DEVICE — Z INACTIVE USE 2525529 CONNECTOR TBNG FOR O2

## (undated) DEVICE — JELLY,LUBE,STERILE,FLIP TOP,TUBE,2-OZ: Brand: MEDLINE

## (undated) DEVICE — Z DISCONTINUED USE 2424143 ADAPTER O2 SWVL CHRISTMAS TREE GRN

## (undated) DEVICE — Z DUPLICATE USE 2527422 TUBING O2 STD 7 FT EXTN NO CRUSH VISUAL CNTRST LF

## (undated) DEVICE — GLOVE ORANGE PI 8   MSG9080

## (undated) DEVICE — GOWN,POLY REINFORCED,LG: Brand: MEDLINE